# Patient Record
Sex: FEMALE | Race: WHITE | Employment: FULL TIME | URBAN - METROPOLITAN AREA
[De-identification: names, ages, dates, MRNs, and addresses within clinical notes are randomized per-mention and may not be internally consistent; named-entity substitution may affect disease eponyms.]

---

## 2017-08-29 ENCOUNTER — HOSPITAL ENCOUNTER (OUTPATIENT)
Dept: LAB | Age: 22
Discharge: HOME | End: 2017-08-29

## 2017-08-29 ENCOUNTER — OFFICE VISIT (OUTPATIENT)
Dept: INTERNAL MEDICINE CLINIC | Age: 22
End: 2017-08-29

## 2017-08-29 VITALS
WEIGHT: 263 LBS | HEART RATE: 93 BPM | DIASTOLIC BLOOD PRESSURE: 68 MMHG | HEIGHT: 65 IN | SYSTOLIC BLOOD PRESSURE: 124 MMHG | TEMPERATURE: 96.9 F | BODY MASS INDEX: 43.82 KG/M2 | OXYGEN SATURATION: 97 %

## 2017-08-29 DIAGNOSIS — Z86.2 HISTORY OF ANEMIA: Primary | ICD-10-CM

## 2017-08-29 PROCEDURE — 99001 SPECIMEN HANDLING PT-LAB: CPT | Performed by: NURSE PRACTITIONER

## 2017-08-29 NOTE — PROGRESS NOTES
HISTORY OF PRESENT ILLNESS  Radonna Cogan is a 25 y.o. female. HPI Comments: Patient was also concerned regarding a small patch of bruise on her leg followed by bug bite. Anemia   The history is provided by the patient. This is a chronic problem. The problem occurs constantly. The problem has not changed since onset. Pertinent negatives include no chest pain, no abdominal pain, no headaches and no shortness of breath. Nothing aggravates the symptoms. Nothing relieves the symptoms. She has tried nothing for the symptoms. The treatment provided no relief. Review of Systems   Constitutional: Negative for chills, fever and malaise/fatigue. HENT: Negative for congestion and hearing loss. Eyes: Negative for blurred vision and double vision. Respiratory: Negative for cough and shortness of breath. Cardiovascular: Negative for chest pain and palpitations. Gastrointestinal: Negative for abdominal pain, heartburn, nausea and vomiting. Musculoskeletal: Negative for myalgias. Skin: Negative for itching and rash. Neurological: Negative for dizziness and headaches. Endo/Heme/Allergies: Negative for environmental allergies and polydipsia. Does not bruise/bleed easily. Psychiatric/Behavioral: Negative for depression. Physical Exam   Constitutional: She is oriented to person, place, and time. She appears well-developed. No distress. HENT:   Head: Normocephalic. Eyes: Pupils are equal, round, and reactive to light. Neck: Neck supple. Cardiovascular: Regular rhythm. Pulmonary/Chest: Breath sounds normal.   Neurological: She is alert and oriented to person, place, and time. Skin: Skin is warm. She is not diaphoretic. Psychiatric: She has a normal mood and affect. Nursing note and vitals reviewed. ASSESSMENT and PLAN    ICD-10-CM ICD-9-CM    1. History of anemia Z86.2 V12.3 CBC WITH AUTOMATED DIFF   patient reassured that bruising might be caused by itching.  Advised to keep an eye on it. Report back if it starts spreading or worsening in any sense. Patient verbalized the understanding.

## 2017-08-29 NOTE — PROGRESS NOTES
ROOM # [address] presents today for   Chief Complaint   Patient presents with    Rash     left leg       Sarai Sheehan preferred language for health care discussion is english/other. Is someone accompanying this pt? no    Is the patient using any DME equipment during OV? no    Depression Screening:  PHQ over the last two weeks 8/29/2017   Little interest or pleasure in doing things Not at all   Feeling down, depressed or hopeless Not at all   Total Score PHQ 2 0       Learning Assessment:  Learning Assessment 8/29/2017   PRIMARY LEARNER Patient   HIGHEST LEVEL OF EDUCATION - PRIMARY LEARNER  GRADUATED HIGH SCHOOL OR GED   BARRIERS PRIMARY LEARNER NONE   CO-LEARNER CAREGIVER No   PRIMARY LANGUAGE ENGLISH   LEARNER PREFERENCE PRIMARY LISTENING   ANSWERED BY patient    RELATIONSHIP SELF       Abuse Screening:  n/i    Fall Risk  n/i    Health Maintenance reviewed and discussed per provider. Yes    Sarai Sheehan is due for INFLUENZA AGE 9 TO ADULT  Please order/place referral if appropriate. Advance Directive:  1. Do you have an advance directive in place? Patient Reply: no    2. If not, would you like material regarding how to put one in place? Patient Reply: no    Coordination of Care:  1. Have you been to the ER, urgent care clinic since your last visit? Hospitalized since your last visit? no    2. Have you seen or consulted any other health care providers outside of the [address] [place] [address] since your last visit? Include any pap smears or colon screening.  no

## 2017-08-29 NOTE — MR AVS SNAPSHOT
Visit Information Date & Time Provider Department Dept. Phone Encounter #  
 8/29/2017  4:45 PM Sheng Amezcua  [facility] [id number] Upcoming Health Maintenance Date Due  
 HPV AGE 9Y-34Y (1 of 3 - Female 3 Dose Series) 8/23/2006 PAP AKA CERVICAL CYTOLOGY 8/23/2016 INFLUENZA AGE 9 TO ADULT 8/1/2017 DTaP/Tdap/Td series (2 - Td) 12/21/2025 Allergies as of 8/29/2017  Review Complete On: 8/29/2017 By: Antonio Monreal Severity Noted Reaction Type Reactions Other Plant, Animal, Environmental  12/20/2016    Hives  
 chlorine Current Immunizations  Reviewed on 2/18/2016 Name Date DTaP 12/21/2015 Influenza Vaccine (Quad) PF 2/19/2016  2:29 PM  
 Tdap 12/21/2015 Not reviewed this visit You Were Diagnosed With   
  
 Codes Comments History of anemia    -  Primary ICD-10-CM: E17.5 ICD-9-CM: V12.3 Vitals BP Pulse Temp Height(growth percentile) Weight(growth percentile) SpO2  
 124/68 (BP 1 Location: Right arm, BP Patient Position: Sitting) 93 96.9 °F (36.1 °C) (Oral) 5' 5\" (1.651 m) 263 lb (119.3 kg) 97% BMI OB Status Smoking Status 43.77 kg/m2 Having regular periods Never Smoker BMI and BSA Data Body Mass Index Body Surface Area 43.77 kg/m 2 2.34 m 2 Preferred Pharmacy Pharmacy Name Phone [facility] #[id number] Rubinstein, [address] [facility] [phone] Your Updated Medication List  
  
   
This list is accurate as of: 8/29/17  5:05 PM.  Always use your most recent med list.  
  
  
  
  
 HYDROcodone-acetaminophen 5-500 mg per tablet Commonly known as:  Arnav Halls Take 1 Tab by mouth every eight (8) hours as needed for Pain. ibuprofen 800 mg tablet Commonly known as:  MOTRIN Take 1 Tab by mouth every eight (8) hours as needed for Pain. MINASTRIN 24 FE 1 mg-20 mcg(24) /75 mg (4) Chew Generic drug:  norethindrone-e.estradiol-iron PNV with Ca No.65-Iron Poly-FA 60 mg iron-1 mg Cap Take 1 Tab by mouth daily. To-Do List   
 08/29/2017 Lab:  CBC WITH AUTOMATED DIFF Introducing [facility] SERVICES! Tad Stewart introduces [employer] patient portal. Now you can access parts of your medical record, email your doctor's office, and request medication refills online. 1. In your internet browser, go to [email or web address] 2. Click on the First Time User? Click Here link in the Sign In box. You will see the New Member Sign Up page. 3. Enter your [employer] Access Code exactly as it appears below. You will not need to use this code after youve completed the sign-up process. If you do not sign up before the expiration date, you must request a new code. · [employer] Access Code: [email or web address] Expires: 11/27/2017  5:05 PM 
 
4. Enter the last four digits of your Social Security Number (xxxx) and Date of Birth (mm/dd/yyyy) as indicated and click Submit. You will be taken to the next sign-up page. 5. Create a [employer] ID. This will be your [employer] login ID and cannot be changed, so think of one that is secure and easy to remember. 6. Create a [employer] password. You can change your password at any time. 7. Enter your Password Reset Question and Answer. This can be used at a later time if you forget your password. 8. Enter your e-mail address. You will receive e-mail notification when new information is available in [address] 9. Click Sign Up. You can now view and download portions of your medical record. 10. Click the Download Summary menu link to download a portable copy of your medical information. If you have questions, please visit the Frequently Asked Questions section of the [employer] website. Remember, [employer] is NOT to be used for urgent needs. For medical emergencies, dial 911. Now available from your iPhone and Android! Please provide this summary of care documentation to your next provider. Your primary care clinician is listed as NONE. If you have any questions after today's visit, please call [phone].

## 2017-08-30 LAB
BASOPHILS # BLD AUTO: 0 X10E3/UL (ref 0–0.2)
BASOPHILS NFR BLD AUTO: 0 %
EOSINOPHIL # BLD AUTO: 0.8 X10E3/UL (ref 0–0.4)
EOSINOPHIL NFR BLD AUTO: 7 %
ERYTHROCYTE [DISTWIDTH] IN BLOOD BY AUTOMATED COUNT: 12.9 % (ref 12.3–15.4)
HCT VFR BLD AUTO: 35.8 % (ref 34–46.6)
HGB BLD-MCNC: 11.4 G/DL (ref 11.1–15.9)
IMM GRANULOCYTES # BLD: 0 X10E3/UL (ref 0–0.1)
IMM GRANULOCYTES NFR BLD: 0 %
LYMPHOCYTES # BLD AUTO: 3.4 X10E3/UL (ref 0.7–3.1)
LYMPHOCYTES NFR BLD AUTO: 29 %
MCH RBC QN AUTO: 26.3 PG (ref 26.6–33)
MCHC RBC AUTO-ENTMCNC: 31.8 G/DL (ref 31.5–35.7)
MCV RBC AUTO: 83 FL (ref 79–97)
MONOCYTES # BLD AUTO: 0.7 X10E3/UL (ref 0.1–0.9)
MONOCYTES NFR BLD AUTO: 6 %
NEUTROPHILS # BLD AUTO: 7 X10E3/UL (ref 1.4–7)
NEUTROPHILS NFR BLD AUTO: 58 %
PLATELET # BLD AUTO: 415 X10E3/UL (ref 150–379)
RBC # BLD AUTO: 4.34 X10E6/UL (ref 3.77–5.28)
WBC # BLD AUTO: 12 X10E3/UL (ref 3.4–10.8)

## 2017-09-05 ENCOUNTER — TELEPHONE (OUTPATIENT)
Dept: INTERNAL MEDICINE CLINIC | Age: 22
End: 2017-09-05

## 2017-09-08 ENCOUNTER — OFFICE VISIT (OUTPATIENT)
Dept: INTERNAL MEDICINE CLINIC | Age: 22
End: 2017-09-08

## 2017-09-08 VITALS
SYSTOLIC BLOOD PRESSURE: 162 MMHG | DIASTOLIC BLOOD PRESSURE: 93 MMHG | RESPIRATION RATE: 18 BRPM | TEMPERATURE: 95.7 F | WEIGHT: 263 LBS | OXYGEN SATURATION: 99 % | HEART RATE: 70 BPM | HEIGHT: 65 IN | BODY MASS INDEX: 43.82 KG/M2

## 2017-09-08 DIAGNOSIS — R79.89 ABNORMAL CBC: ICD-10-CM

## 2017-09-08 DIAGNOSIS — Z83.3 FAMILY HISTORY OF DIABETES MELLITUS: ICD-10-CM

## 2017-09-08 DIAGNOSIS — E66.09 NON MORBID OBESITY DUE TO EXCESS CALORIES: ICD-10-CM

## 2017-09-08 DIAGNOSIS — Z86.2 HISTORY OF ANEMIA: Primary | ICD-10-CM

## 2017-09-08 PROBLEM — E66.9 NON MORBID OBESITY: Status: ACTIVE | Noted: 2017-09-08

## 2017-09-08 NOTE — PROGRESS NOTES
HISTORY OF PRESENT ILLNESS  Fiorella Stone is a 25 y.o. female. HPI Comments: Patient was here to follow up on labs done recently, abnormal cbc results. Current complaints include obesity, excessive bruising. Follow-up   Pertinent negatives include no chest pain, no abdominal pain, no headaches and no shortness of breath. Review of Systems   Constitutional: Negative for chills, diaphoresis, fever, malaise/fatigue and weight loss. HENT: Negative for congestion, ear pain, hearing loss, sinus pain and sore throat. Eyes: Negative for blurred vision and double vision. Respiratory: Negative for cough and shortness of breath. Cardiovascular: Negative for chest pain and palpitations. Gastrointestinal: Negative for abdominal pain, heartburn, nausea and vomiting. Genitourinary: Negative for dysuria, frequency and urgency. Musculoskeletal: Negative for back pain, joint pain, myalgias and neck pain. Skin: Negative for rash. Neurological: Negative for dizziness, speech change, seizures, weakness and headaches. Endo/Heme/Allergies: Negative for environmental allergies and polydipsia. Bruises/bleeds easily. Psychiatric/Behavioral: Negative for depression, hallucinations and substance abuse. The patient is not nervous/anxious and does not have insomnia. Physical Exam   Constitutional: She is oriented to person, place, and time. She appears well-developed. No distress. HENT:   Head: Normocephalic and atraumatic. Right Ear: External ear normal.   Left Ear: External ear normal.   Mouth/Throat: Oropharynx is clear and moist. No oropharyngeal exudate. Eyes: EOM are normal. Pupils are equal, round, and reactive to light. Neck: Normal range of motion. Neck supple. No thyromegaly present. Cardiovascular: Regular rhythm. Pulmonary/Chest: Breath sounds normal. She has no wheezes. Abdominal: Soft. Bowel sounds are normal. She exhibits no distension. There is no tenderness. Musculoskeletal: Normal range of motion. She exhibits no edema or tenderness. Neurological: She is alert and oriented to person, place, and time. No cranial nerve deficit. Skin: Skin is warm and dry. No rash noted. She is not diaphoretic. No erythema. Psychiatric: She has a normal mood and affect. Nursing note and vitals reviewed. ASSESSMENT and PLAN    ICD-10-CM ICD-9-CM    1. History of anemia Z86.2 V12.3 CBC WITH AUTOMATED DIFF      CBC WITH AUTOMATED DIFF   2. Abnormal CBC R79.89 790.6 CBC WITH AUTOMATED DIFF      METABOLIC PANEL, COMPREHENSIVE      PROTHROMBIN TIME + INR      CBC WITH AUTOMATED DIFF      METABOLIC PANEL, COMPREHENSIVE      PROTHROMBIN TIME + INR   3. Non morbid obesity due to excess calories O83.79 596.61 METABOLIC PANEL, COMPREHENSIVE      TSH 3RD GENERATION      LIPID PANEL      METABOLIC PANEL, COMPREHENSIVE      TSH 3RD GENERATION      LIPID PANEL   4. Family history of diabetes mellitus Z83.3 V18.0 HEMOGLOBIN A1C WITH EAG      HEMOGLOBIN A1C WITH EAG   patient advised to frequently check her blood pressure for next 2 weeks and report the readings. Plan of care will be established accordingly. Head to toe assessment performed, all basic labs will be obtained. Patient teaching done regarding healthy eating and exercise. Patient advised to keep in contact with PCP for regular checkups. Patient verbalized the understanding of instructions and plan of care. Further plan of care will be established according to lab results.

## 2017-09-08 NOTE — PROGRESS NOTES
ROOM #8     Guicho Munoz presents today for   Chief Complaint   Patient presents with   [facility]     pt presents to stating that she need to have her labs re-done       Guicho Munoz preferred language for health care discussion is english/other. Is someone accompanying this pt? no    Is the patient using any DME equipment during OV? no    Depression Screening:  PHQ over the last two weeks 9/8/2017 8/29/2017   Little interest or pleasure in doing things Not at all Not at all   Feeling down, depressed or hopeless Not at all Not at all   Total Score PHQ 2 0 0       Learning Assessment:  Learning Assessment 8/29/2017   PRIMARY LEARNER Patient   HIGHEST LEVEL OF EDUCATION - PRIMARY LEARNER  GRADUATED HIGH SCHOOL OR GED   BARRIERS PRIMARY LEARNER NONE   CO-LEARNER CAREGIVER No   PRIMARY LANGUAGE ENGLISH   LEARNER PREFERENCE PRIMARY LISTENING   ANSWERED BY patient    RELATIONSHIP SELF       Abuse Screening:  No flowsheet data found. Fall Risk  No flowsheet data found. Health Maintenance reviewed and discussed per provider. Yes    Guicho Munoz is due for hpv, pap, inlfuenza. Please order/place referral if appropriate. Advance Directive:  1. Do you have an advance directive in place? Patient Reply: no    2. If not, would you like material regarding how to put one in place? Patient Reply: no    Coordination of Care:  1. Have you been to the ER, urgent care clinic since your last visit? Hospitalized since your last visit? no    2. Have you seen or consulted any other health care providers outside of the [address] [place] [address] since your last visit? Include any pap smears or colon screening.  no

## 2017-09-08 NOTE — PATIENT INSTRUCTIONS
Abnormal Weight Gain: Care Instructions  Your Care Instructions  There are two types of weight gain--normal and abnormal. Normal weight gain is usually caused by eating too much or exercising too little. It can also happen as you get older. But abnormal weight gain has other causes. It can be caused by a problem with your thyroid gland, called hypothyroidism. Or it can be caused by a problem with your adrenal glands, called Cushing's syndrome. Or your body could be holding too much fluid because of kidney, liver, or heart problems. In some cases, a medicine you take can cause you to gain weight. You can work with your doctor to find out the cause of your weight gain. You will probably need tests to do this. Follow-up care is a key part of your treatment and safety. Be sure to make and go to all appointments, and call your doctor if you are having problems. It's also a good idea to know your test results and keep a list of the medicines you take. How can you care for yourself at home? · Weigh yourself at the same time every day. It's best to do it first thing in the morning after you empty your bladder. Be sure to always wear the same amount of clothing. · Write down any changes in your weight and the possible causes. Discuss these with your doctor. · Your doctor may want you to change your diet and exercise habits. A good way to lose weight is to reduce calories and increase exercise. · Walking is an easy way to get exercise. Try to walk a little longer every day. You also may want to swim, bike, or do other activities. · Ask your doctor if you should see a dietitian. This is a person who can help you plan meals that work best for your lifestyle. · If your doctor prescribed medicines, take them exactly as prescribed. Call your doctor if you think you are having a problem with your medicine. You will get more details on the specific medicines your doctor prescribes.   When should you call for help?  Watch closely for changes in your health, and be sure to contact your doctor if:  · You do not get better as expected. · You continue to gain weight. Where can you learn more? Go to http://deven-emigdio.info/. Enter A175 in the search box to learn more about \"Abnormal Weight Gain: Care Instructions. \"  Current as of: October 13, 2016  Content Version: 11.3  © 8576-9782 [employer]. Care instructions adapted under license by [employer] (which disclaims liability or warranty for this information). If you have questions about a medical condition or this instruction, always ask your healthcare professional. [address] any warranty or liability for your use of this information.

## 2017-09-08 NOTE — MR AVS SNAPSHOT
Visit Information Date & Time Provider Department Dept. Phone Encounter #  
 9/8/2017 10:30 AM Ladi Feng NP [facility] [phone] [id number] Your Appointments 9/8/2017 10:30 AM  
Office Visit with Ladi Feng NP [facility] ([facility]) Appt Note: BLOOD WORK; blood work [address]  
  
   
 [address] [facility] Upcoming Health Maintenance Date Due  
 HPV AGE 9Y-34Y (1 of 3 - Female 3 Dose Series) 8/23/2006 PAP AKA CERVICAL CYTOLOGY 8/23/2016 INFLUENZA AGE 9 TO ADULT 8/1/2017 DTaP/Tdap/Td series (2 - Td) 12/21/2025 Allergies as of 9/8/2017  Review Complete On: 9/8/2017 By: Darlyn Us LPN Severity Noted Reaction Type Reactions Other Plant, Animal, Environmental  12/20/2016    Hives  
 chlorine Current Immunizations  Reviewed on 2/18/2016 Name Date DTaP 12/21/2015 Influenza Vaccine (Quad) PF 2/19/2016  2:29 PM  
 Tdap 12/21/2015 Not reviewed this visit You Were Diagnosed With   
  
 Codes Comments History of anemia    -  Primary ICD-10-CM: Y50.2 ICD-9-CM: V12.3 Abnormal CBC     ICD-10-CM: R79.89 ICD-9-CM: 790.6 Non morbid obesity due to excess calories     ICD-10-CM: E66.09 
ICD-9-CM: 278.00 Family history of diabetes mellitus     ICD-10-CM: Z83.3 ICD-9-CM: V18.0 Vitals BP Pulse Temp Resp Height(growth percentile) Weight(growth percentile) (!) 162/93 (BP 1 Location: Right arm, BP Patient Position: Sitting) 70 95.7 °F (35.4 °C) (Oral) 18 5' 5\" (1.651 m) 263 lb (119.3 kg) LMP SpO2 BMI OB Status Smoking Status 08/18/2017 99% 43.77 kg/m2 Having regular periods Never Smoker Vitals History BMI and BSA Data Body Mass Index Body Surface Area 43.77 kg/m 2 2.34 m 2 Preferred Pharmacy Pharmacy Name Phone [facility] #[id number] [place]ai, [address] [address] Jareth Gaytan [phone] Your Updated Medication List  
  
   
This list is accurate as of: 9/8/17 10:07 AM.  Always use your most recent med list.  
  
  
  
  
 HYDROcodone-acetaminophen 5-500 mg per tablet Commonly known as:  [address] Take 1 Tab by mouth every eight (8) hours as needed for Pain. ibuprofen 800 mg tablet Commonly known as:  MOTRIN Take 1 Tab by mouth every eight (8) hours as needed for Pain. MINASTRIN 24 FE 1 mg-20 mcg(24) /75 mg (4) Chew Generic drug:  norethindrone-e.estradiol-iron PNV with Ca No.65-Iron Poly-FA 60 mg iron-1 mg Cap Take 1 Tab by mouth daily. To-Do List   
 09/08/2017 Lab:  CBC WITH AUTOMATED DIFF   
  
 09/08/2017 Lab:  HEMOGLOBIN A1C WITH EAG   
  
 09/08/2017 Lab:  LIPID PANEL   
  
 09/08/2017 Lab:  METABOLIC PANEL, COMPREHENSIVE   
  
 09/08/2017 Lab:  PROTHROMBIN TIME + INR   
  
 09/08/2017 Lab:  TSH 3RD GENERATION Patient Instructions Abnormal Weight Gain: Care Instructions Your Care Instructions There are two types of weight gainnormal and abnormal. Normal weight gain is usually caused by eating too much or exercising too little. It can also happen as you get older. But abnormal weight gain has other causes. It can be caused by a problem with your thyroid gland, called hypothyroidism. Or it can be caused by a problem with your adrenal glands, called Cushing's syndrome. Or your body could be holding too much fluid because of kidney, liver, or heart problems. In some cases, a medicine you take can cause you to gain weight. You can work with your doctor to find out the cause of your weight gain. You will probably need tests to do this. Follow-up care is a key part of your treatment and safety.  Be sure to make and go to all appointments, and call your doctor if you are having problems. It's also a good idea to know your test results and keep a list of the medicines you take. How can you care for yourself at home? · Weigh yourself at the same time every day. It's best to do it first thing in the morning after you empty your bladder. Be sure to always wear the same amount of clothing. · Write down any changes in your weight and the possible causes. Discuss these with your doctor. · Your doctor may want you to change your diet and exercise habits. A good way to lose weight is to reduce calories and increase exercise. · Walking is an easy way to get exercise. Try to walk a little longer every day. You also may want to swim, bike, or do other activities. · Ask your doctor if you should see a dietitian. This is a person who can help you plan meals that work best for your lifestyle. · If your doctor prescribed medicines, take them exactly as prescribed. Call your doctor if you think you are having a problem with your medicine. You will get more details on the specific medicines your doctor prescribes. When should you call for help? Watch closely for changes in your health, and be sure to contact your doctor if: 
· You do not get better as expected. · You continue to gain weight. Where can you learn more? Go to http://deven-emigdio.info/. Enter A175 in the search box to learn more about \"Abnormal Weight Gain: Care Instructions. \" Current as of: October 13, 2016 Content Version: 11.3 © 0682-6014 [employer]. Care instructions adapted under license by [employer] (which disclaims liability or warranty for this information). If you have questions about a medical condition or this instruction, always ask your healthcare professional. [address] any warranty or liability for your use of this information. Introducing [facility]!    
 Lilly Morales introduces [employer] patient portal. Now you can access parts of your medical record, email your doctor's office, and request medication refills online. 1. In your internet browser, go to [email or web address] 2. Click on the First Time User? Click Here link in the Sign In box. You will see the New Member Sign Up page. 3. Enter your [employer] Access Code exactly as it appears below. You will not need to use this code after youve completed the sign-up process. If you do not sign up before the expiration date, you must request a new code. · [employer] Access Code: [email or web address] Expires: 11/27/2017  5:05 PM 
 
4. Enter the last four digits of your Social Security Number (xxxx) and Date of Birth (mm/dd/yyyy) as indicated and click Submit. You will be taken to the next sign-up page. 5. Create a [employer] ID. This will be your [employer] login ID and cannot be changed, so think of one that is secure and easy to remember. 6. Create a [employer] password. You can change your password at any time. 7. Enter your Password Reset Question and Answer. This can be used at a later time if you forget your password. 8. Enter your e-mail address. You will receive e-mail notification when new information is available in [address] 9. Click Sign Up. You can now view and download portions of your medical record. 10. Click the Download Summary menu link to download a portable copy of your medical information. If you have questions, please visit the Frequently Asked Questions section of the [employer] website. Remember, [employer] is NOT to be used for urgent needs. For medical emergencies, dial 911. Now available from your iPhone and Android! Please provide this summary of care documentation to your next provider. Your primary care clinician is listed as NONE. If you have any questions after today's visit, please call [phone].

## 2017-09-09 LAB
ALBUMIN SERPL-MCNC: 4.5 G/DL (ref 3.5–5.5)
ALBUMIN/GLOB SERPL: 1.7 {RATIO} (ref 1.2–2.2)
ALP SERPL-CCNC: 83 IU/L (ref 39–117)
ALT SERPL-CCNC: 24 IU/L (ref 0–32)
AST SERPL-CCNC: 14 IU/L (ref 0–40)
BASOPHILS # BLD AUTO: 0.1 X10E3/UL (ref 0–0.2)
BASOPHILS NFR BLD AUTO: 0 %
BILIRUB SERPL-MCNC: 0.3 MG/DL (ref 0–1.2)
BUN SERPL-MCNC: 9 MG/DL (ref 6–20)
BUN/CREAT SERPL: 15 (ref 9–23)
CALCIUM SERPL-MCNC: 9.7 MG/DL (ref 8.7–10.2)
CHLORIDE SERPL-SCNC: 100 MMOL/L (ref 96–106)
CHOLEST SERPL-MCNC: 193 MG/DL (ref 100–199)
CO2 SERPL-SCNC: 24 MMOL/L (ref 18–29)
CREAT SERPL-MCNC: 0.59 MG/DL (ref 0.57–1)
EOSINOPHIL # BLD AUTO: 1.6 X10E3/UL (ref 0–0.4)
EOSINOPHIL NFR BLD AUTO: 12 %
ERYTHROCYTE [DISTWIDTH] IN BLOOD BY AUTOMATED COUNT: 13 % (ref 12.3–15.4)
EST. AVERAGE GLUCOSE BLD GHB EST-MCNC: 94 MG/DL
GLOBULIN SER CALC-MCNC: 2.7 G/DL (ref 1.5–4.5)
GLUCOSE SERPL-MCNC: 87 MG/DL (ref 65–99)
HBA1C MFR BLD: 4.9 % (ref 4.8–5.6)
HCT VFR BLD AUTO: 39.3 % (ref 34–46.6)
HDLC SERPL-MCNC: 66 MG/DL
HGB BLD-MCNC: 12.7 G/DL (ref 11.1–15.9)
IMM GRANULOCYTES # BLD: 0 X10E3/UL (ref 0–0.1)
IMM GRANULOCYTES NFR BLD: 0 %
INR PPP: 0.9 (ref 0.8–1.2)
INTERPRETATION, 910389: NORMAL
LDLC SERPL CALC-MCNC: 110 MG/DL (ref 0–99)
LYMPHOCYTES # BLD AUTO: 2.9 X10E3/UL (ref 0.7–3.1)
LYMPHOCYTES NFR BLD AUTO: 23 %
MCH RBC QN AUTO: 27.1 PG (ref 26.6–33)
MCHC RBC AUTO-ENTMCNC: 32.3 G/DL (ref 31.5–35.7)
MCV RBC AUTO: 84 FL (ref 79–97)
MONOCYTES # BLD AUTO: 0.6 X10E3/UL (ref 0.1–0.9)
MONOCYTES NFR BLD AUTO: 5 %
NEUTROPHILS # BLD AUTO: 7.4 X10E3/UL (ref 1.4–7)
NEUTROPHILS NFR BLD AUTO: 60 %
PLATELET # BLD AUTO: 398 X10E3/UL (ref 150–379)
POTASSIUM SERPL-SCNC: 4.8 MMOL/L (ref 3.5–5.2)
PROT SERPL-MCNC: 7.2 G/DL (ref 6–8.5)
PROTHROMBIN TIME: 9.5 SEC (ref 9.1–12)
RBC # BLD AUTO: 4.69 X10E6/UL (ref 3.77–5.28)
SODIUM SERPL-SCNC: 142 MMOL/L (ref 134–144)
TRIGL SERPL-MCNC: 85 MG/DL (ref 0–149)
TSH SERPL DL<=0.005 MIU/L-ACNC: 1.37 UIU/ML (ref 0.45–4.5)
VLDLC SERPL CALC-MCNC: 17 MG/DL (ref 5–40)
WBC # BLD AUTO: 12.5 X10E3/UL (ref 3.4–10.8)

## 2017-09-12 DIAGNOSIS — R79.89 ABNORMAL CBC: Primary | ICD-10-CM

## 2018-01-02 ENCOUNTER — OFFICE VISIT (OUTPATIENT)
Dept: INTERNAL MEDICINE CLINIC | Age: 23
End: 2018-01-02

## 2018-01-02 VITALS
WEIGHT: 288 LBS | TEMPERATURE: 97.4 F | DIASTOLIC BLOOD PRESSURE: 83 MMHG | RESPIRATION RATE: 18 BRPM | OXYGEN SATURATION: 98 % | HEART RATE: 92 BPM | HEIGHT: 65 IN | BODY MASS INDEX: 47.98 KG/M2 | SYSTOLIC BLOOD PRESSURE: 131 MMHG

## 2018-01-02 DIAGNOSIS — J03.90 TONSILLITIS WITH EXUDATE: ICD-10-CM

## 2018-01-02 DIAGNOSIS — J02.9 SORE THROAT: ICD-10-CM

## 2018-01-02 DIAGNOSIS — R05.9 COUGH: Primary | ICD-10-CM

## 2018-01-02 LAB
S PYO AG THROAT QL: NEGATIVE
VALID INTERNAL CONTROL?: YES

## 2018-01-02 RX ORDER — AMOXICILLIN AND CLAVULANATE POTASSIUM 875; 125 MG/1; MG/1
1 TABLET, FILM COATED ORAL EVERY 12 HOURS
Qty: 10 TAB | Refills: 0 | Status: SHIPPED | OUTPATIENT
Start: 2018-01-02 | End: 2018-01-07

## 2018-01-02 RX ORDER — BENZONATATE 100 MG/1
100 CAPSULE ORAL
Qty: 21 CAP | Refills: 0 | Status: SHIPPED | OUTPATIENT
Start: 2018-01-02 | End: 2018-01-09

## 2018-01-02 NOTE — PATIENT INSTRUCTIONS

## 2018-01-02 NOTE — PROGRESS NOTES
HISTORY OF PRESENT ILLNESS  Mary Jo Art is a 25 y.o. female. Cold Symptoms   The history is provided by the patient. This is a new problem. The current episode started yesterday. The problem occurs constantly. The problem has not changed since onset. The cough is non-productive. There has been no fever. Associated symptoms include chills, ear congestion, ear pain, headaches, rhinorrhea and sore throat. Pertinent negatives include no chest pain, no sweats, no weight loss, no eye redness, no myalgias, no shortness of breath, no wheezing, no nausea, no vomiting and no confusion. She has tried nothing for the symptoms. The treatment provided no relief. She is not a smoker. Her past medical history is significant for bronchitis. Her past medical history does not include pneumonia or asthma. Review of Systems   Constitutional: Positive for chills, fever and malaise/fatigue. Negative for weight loss. HENT: Positive for congestion, ear pain, rhinorrhea, sinus pain and sore throat. Eyes: Negative for blurred vision, double vision, photophobia and redness. Respiratory: Positive for cough and sputum production. Negative for shortness of breath and wheezing. Cardiovascular: Negative for chest pain and palpitations. Gastrointestinal: Negative for heartburn, nausea and vomiting. Musculoskeletal: Negative for myalgias. Neurological: Positive for headaches. Negative for dizziness. Endo/Heme/Allergies: Negative for environmental allergies and polydipsia. Psychiatric/Behavioral: Negative for confusion. The patient is not nervous/anxious. Physical Exam   Constitutional: She is oriented to person, place, and time. She appears well-developed and well-nourished. No distress. HENT:   Head: Normocephalic and atraumatic. Right Ear: Tympanic membrane is injected and erythematous. Left Ear: Tympanic membrane is injected and erythematous. Nose: Mucosal edema and rhinorrhea present.  Right sinus exhibits no frontal sinus tenderness. Left sinus exhibits no frontal sinus tenderness. Mouth/Throat: Mucous membranes are normal. Mucous membranes are not pale and not dry. Oropharyngeal exudate, posterior oropharyngeal edema and posterior oropharyngeal erythema present. No tonsillar abscesses. Eyes: EOM are normal. Pupils are equal, round, and reactive to light. Neck: Neck supple. Cardiovascular: Regular rhythm. Pulmonary/Chest: Breath sounds normal. No respiratory distress. She has no wheezes. Lymphadenopathy:     She has no cervical adenopathy. Neurological: She is alert and oriented to person, place, and time. No cranial nerve deficit. Coordination normal.   Skin: Skin is dry. She is not diaphoretic. No erythema. Psychiatric: She has a normal mood and affect. Nursing note and vitals reviewed. ASSESSMENT and PLAN    ICD-10-CM ICD-9-CM    1. Cough R05 786.2 amoxicillin-clavulanate (AUGMENTIN) 875-125 mg per tablet      benzonatate (TESSALON) 100 mg capsule   2. Sore throat J02.9 462 amoxicillin-clavulanate (AUGMENTIN) 875-125 mg per tablet      AMB POC RAPID STREP A   3. Tonsillitis with exudate J03.90 463 amoxicillin-clavulanate (AUGMENTIN) 875-125 mg per tablet   Patient advised to take medication as prescribed. Take rest and plenty of fluids. Alternate tylenol and ibuprofen for fever. Return to clinic if condition worsens in next 72 hours.

## 2018-01-02 NOTE — PROGRESS NOTES
ROOM # 3    Cesilia Mccord presents today for   Chief Complaint   Patient presents with    Cold Symptoms     cough, green mucus, sore thorat, x 1 week       Cesilia Mccord preferred language for health care discussion is english/other. Is someone accompanying this pt? no    Is the patient using any DME equipment during OV? no    Depression Screening:  PHQ over the last two weeks 1/2/2018 9/8/2017 8/29/2017   Little interest or pleasure in doing things Not at all Not at all Not at all   Feeling down, depressed or hopeless Not at all Not at all Not at all   Total Score PHQ 2 0 0 0       Learning Assessment:  Learning Assessment 8/29/2017   PRIMARY LEARNER Patient   HIGHEST LEVEL OF EDUCATION - PRIMARY LEARNER  GRADUATED HIGH SCHOOL OR GED   BARRIERS PRIMARY LEARNER NONE   CO-LEARNER CAREGIVER No   PRIMARY LANGUAGE ENGLISH   LEARNER PREFERENCE PRIMARY LISTENING   ANSWERED BY patient    RELATIONSHIP SELF       Abuse Screening:  No flowsheet data found. Fall Risk  No flowsheet data found. Health Maintenance reviewed and discussed per provider. Yes    Cesilia Mccord is due for pt will discuss with PCP. Please order/place referral if appropriate. Advance Directive:  1. Do you have an advance directive in place? Patient Reply: no    2. If not, would you like material regarding how to put one in place? Patient Reply: no    Coordination of Care:  1. Have you been to the ER, urgent care clinic since your last visit? Hospitalized since your last visit? no    2. Have you seen or consulted any other health care providers outside of the [address] [place] [address] since your last visit? Include any pap smears or colon screening.  no

## 2018-01-02 NOTE — MR AVS SNAPSHOT
Visit Information Date & Time Provider Department Dept. Phone Encounter #  
 1/2/2018  2:45 PM Jayne Borja NP [facility] 139 [id number] Upcoming Health Maintenance Date Due  
 PAP AKA CERVICAL CYTOLOGY 8/23/2016 Influenza Age 5 to Adult 8/1/2017 DTaP/Tdap/Td series (4 - Td) 12/21/2025 Allergies as of 1/2/2018  Review Complete On: 1/2/2018 By: Jayne Borja NP Severity Noted Reaction Type Reactions Other Plant, Animal, Environmental  12/20/2016    Hives  
 chlorine Current Immunizations  Reviewed on 1/2/2018 Name Date DTaP 12/21/2015 HPV 6/4/2013, 2/13/2007 Hep A Vaccine 3/4/2008 Hep B Vaccine 10/27/1999, 8/28/1996, 1995 Influenza Vaccine (Quad) PF 2/19/2016  2:29 PM  
 MMR 8/15/2000, 9/15/1997 Meningococcal (MCV4P) Vaccine 6/4/2013, 2/13/2007 Td 8/15/2005 Tdap 12/21/2015, 6/29/2011 Varicella Virus Vaccine 3/4/2008 Reviewed by Pau Gonzalez PHARMD on 1/2/2018 at  1:49 PM  
You Were Diagnosed With   
  
 Codes Comments Cough    -  Primary ICD-10-CM: W01 ICD-9-CM: 786.2 Sore throat     ICD-10-CM: J02.9 ICD-9-CM: 693 Tonsillitis with exudate     ICD-10-CM: J03.90 ICD-9-CM: 125 Vitals BP Pulse Temp Resp Height(growth percentile) Weight(growth percentile) 131/83 (BP 1 Location: Right arm, BP Patient Position: Sitting) 92 97.4 °F (36.3 °C) (Oral) 18 5' 5\" (1.651 m) 288 lb (130.6 kg) LMP SpO2 BMI OB Status Smoking Status 12/07/2017 98% 47.93 kg/m2 Having regular periods Never Smoker Vitals History BMI and BSA Data Body Mass Index Body Surface Area  
 47.93 kg/m 2 2.45 m 2 Preferred Pharmacy Pharmacy Name Phone [facility] [phone] Your Updated Medication List  
  
   
This list is accurate as of: 1/2/18  3:24 PM.  Always use your most recent med list.  
  
  
  
  
 amoxicillin-clavulanate 875-125 mg per tablet Commonly known as:  AUGMENTIN Take 1 Tab by mouth every twelve (12) hours for 5 days. benzonatate 100 mg capsule Commonly known as:  TESSALON Take 1 Cap by mouth three (3) times daily as needed for Cough for up to 7 days. HYDROcodone-acetaminophen 5-500 mg per tablet Commonly known as:  Anola Roe Take 1 Tab by mouth every eight (8) hours as needed for Pain. ibuprofen 800 mg tablet Commonly known as:  MOTRIN Take 1 Tab by mouth every eight (8) hours as needed for Pain. MINASTRIN 24 FE 1 mg-20 mcg(24) /75 mg (4) Chew Generic drug:  norethindrone-e.estradiol-iron PNV with Ca No.65-Iron Poly-FA 60 mg iron-1 mg Cap Take 1 Tab by mouth daily. Prescriptions Sent to Pharmacy Refills  
 amoxicillin-clavulanate (AUGMENTIN) 875-125 mg per tablet 0 Sig: Take 1 Tab by mouth every twelve (12) hours for 5 days. Class: Normal  
 Pharmacy: [address] [place] [address] Ph #: [phone] Route: Oral  
 benzonatate (TESSALON) 100 mg capsule 0 Sig: Take 1 Cap by mouth three (3) times daily as needed for Cough for up to 7 days. Class: Normal  
 Pharmacy: [address] [place] [address] Ph #: [phone] Route: Oral  
  
Patient Instructions Cough: Care Instructions Your Care Instructions A cough is your body's response to something that bothers your throat or airways. Many things can cause a cough. You might cough because of a cold or the flu, bronchitis, or asthma. Smoking, postnasal drip, allergies, and stomach acid that backs up into your throat also can cause coughs. A cough is a symptom, not a disease. Most coughs stop when the cause, such as a cold, goes away. You can take a few steps at home to cough less and feel better. Follow-up care is a key part of your treatment and safety.  Be sure to make and go to all appointments, and call your doctor if you are having problems. It's also a good idea to know your test results and keep a list of the medicines you take. How can you care for yourself at home? · Drink lots of water and other fluids. This helps thin the mucus and soothes a dry or sore throat. Honey or lemon juice in hot water or tea may ease a dry cough. · Take cough medicine as directed by your doctor. · Prop up your head on pillows to help you breathe and ease a dry cough. · Try cough drops to soothe a dry or sore throat. Cough drops don't stop a cough. Medicine-flavored cough drops are no better than candy-flavored drops or hard candy. · Do not smoke. Avoid secondhand smoke. If you need help quitting, talk to your doctor about stop-smoking programs and medicines. These can increase your chances of quitting for good. When should you call for help? Call 911 anytime you think you may need emergency care. For example, call if: 
? · You have severe trouble breathing. ?Call your doctor now or seek immediate medical care if: 
? · You cough up blood. ? · You have new or worse trouble breathing. ? · You have a new or higher fever. ? · You have a new rash. ? Watch closely for changes in your health, and be sure to contact your doctor if: 
? · You cough more deeply or more often, especially if you notice more mucus or a change in the color of your mucus. ? · You have new symptoms, such as a sore throat, an earache, or sinus pain. ? · You do not get better as expected. Where can you learn more? Go to http://deven-emigdio.info/. Enter D279 in the search box to learn more about \"Cough: Care Instructions. \" Current as of: May 12, 2017 Content Version: 11.4 © 0001-3833 [employer]. Care instructions adapted under license by [employer] (which disclaims liability or warranty for this information).  If you have questions about a medical condition or this instruction, always ask your healthcare professional. [place] [address] any warranty or liability for your use of this information. Introducing [facility]! Elisabeth Marlow introduces [employer] patient portal. Now you can access parts of your medical record, email your doctor's office, and request medication refills online. 1. In your internet browser, go to [email or web address]. "[employer]/[email or web address] 2. Click on the First Time User? Click Here link in the Sign In box. You will see the New Member Sign Up page. 3. Enter your [employer] Access Code exactly as it appears below. You will not need to use this code after youve completed the sign-up process. If you do not sign up before the expiration date, you must request a new code. · [employer] Access Code: [email or web address] Expires: 4/2/2018  3:24 PM 
 
4. Enter the last four digits of your Social Security Number (xxxx) and Date of Birth (mm/dd/yyyy) as indicated and click Submit. You will be taken to the next sign-up page. 5. Create a [employer] ID. This will be your [employer] login ID and cannot be changed, so think of one that is secure and easy to remember. 6. Create a [employer] password. You can change your password at any time. 7. Enter your Password Reset Question and Answer. This can be used at a later time if you forget your password. 8. Enter your e-mail address. You will receive e-mail notification when new information is available in [address] 9. Click Sign Up. You can now view and download portions of your medical record. 10. Click the Download Summary menu link to download a portable copy of your medical information. If you have questions, please visit the Frequently Asked Questions section of the [employer] website. Remember, [employer] is NOT to be used for urgent needs. For medical emergencies, dial 911. Now available from your iPhone and Android! Please provide this summary of care documentation to your next provider. Your primary care clinician is listed as Batsheva Choi. If you have any questions after today's visit, please call [phone].

## 2018-04-09 ENCOUNTER — OFFICE VISIT (OUTPATIENT)
Dept: INTERNAL MEDICINE CLINIC | Age: 23
End: 2018-04-09

## 2018-04-09 VITALS
BODY MASS INDEX: 48.65 KG/M2 | WEIGHT: 292 LBS | RESPIRATION RATE: 16 BRPM | HEART RATE: 103 BPM | DIASTOLIC BLOOD PRESSURE: 84 MMHG | TEMPERATURE: 96.6 F | HEIGHT: 65 IN | SYSTOLIC BLOOD PRESSURE: 135 MMHG | OXYGEN SATURATION: 95 %

## 2018-04-09 DIAGNOSIS — R11.14 BILIOUS VOMITING WITH NAUSEA: Primary | ICD-10-CM

## 2018-04-09 DIAGNOSIS — R19.7 DIARRHEA, UNSPECIFIED TYPE: ICD-10-CM

## 2018-04-09 PROBLEM — E66.01 OBESITY, MORBID (HCC): Status: ACTIVE | Noted: 2018-04-09

## 2018-04-09 RX ORDER — ONDANSETRON 4 MG/1
4 TABLET, ORALLY DISINTEGRATING ORAL
Qty: 9 TAB | Refills: 0 | Status: SHIPPED | OUTPATIENT
Start: 2018-04-09 | End: 2018-04-12

## 2018-04-09 RX ORDER — METOCLOPRAMIDE 5 MG/1
5 TABLET ORAL
Qty: 30 TAB | Refills: 0 | Status: SHIPPED | OUTPATIENT
Start: 2018-04-09 | End: 2018-04-19

## 2018-04-09 RX ORDER — PANTOPRAZOLE SODIUM 40 MG/1
40 TABLET, DELAYED RELEASE ORAL DAILY
Qty: 30 TAB | Refills: 0 | Status: SHIPPED | OUTPATIENT
Start: 2018-04-09 | End: 2018-06-02 | Stop reason: SDUPTHER

## 2018-04-09 NOTE — PATIENT INSTRUCTIONS
Diarrhea: Care Instructions  Your Care Instructions    Diarrhea is loose, watery stools (bowel movements). The exact cause is often hard to find. Sometimes diarrhea is your body's way of getting rid of what caused an upset stomach. Viruses, food poisoning, and many medicines can cause diarrhea. Some people get diarrhea in response to emotional stress, anxiety, or certain foods. Almost everyone has diarrhea now and then. It usually isn't serious, and your stools will return to normal soon. The important thing to do is replace the fluids you have lost, so you can prevent dehydration. The doctor has checked you carefully, but problems can develop later. If you notice any problems or new symptoms, get medical treatment right away. Follow-up care is a key part of your treatment and safety. Be sure to make and go to all appointments, and call your doctor if you are having problems. It's also a good idea to know your test results and keep a list of the medicines you take. How can you care for yourself at home? · Watch for signs of dehydration, which means your body has lost too much water. Dehydration is a serious condition and should be treated right away. Signs of dehydration are:  ¨ Increasing thirst and dry eyes and mouth. ¨ Feeling faint or lightheaded. ¨ Darker urine, and a smaller amount of urine than normal.  · To prevent dehydration, drink plenty of fluids, enough so that your urine is light yellow or clear like water. Choose water and other caffeine-free clear liquids until you feel better. If you have kidney, heart, or liver disease and have to limit fluids, talk with your doctor before you increase the amount of fluids you drink. · Begin eating small amounts of mild foods the next day, if you feel like it. ¨ Try yogurt that has live cultures of Lactobacillus. (Check the label.)  ¨ Avoid spicy foods, fruits, alcohol, and caffeine until 48 hours after all symptoms are gone.   ¨ Avoid chewing gum that contains sorbitol. ¨ Avoid dairy products (except for yogurt with Lactobacillus) while you have diarrhea and for 3 days after symptoms are gone. · The doctor may recommend that you take over-the-counter medicine, such as loperamide (Imodium), if you still have diarrhea after 6 hours. Read and follow all instructions on the label. Do not use this medicine if you have bloody diarrhea, a high fever, or other signs of serious illness. Call your doctor if you think you are having a problem with your medicine. When should you call for help? Call 911 anytime you think you may need emergency care. For example, call if:  ? · You passed out (lost consciousness). ? · Your stools are maroon or very bloody. ?Call your doctor now or seek immediate medical care if:  ? · You are dizzy or lightheaded, or you feel like you may faint. ? · Your stools are black and look like tar, or they have streaks of blood. ? · You have new or worse belly pain. ? · You have symptoms of dehydration, such as:  ¨ Dry eyes and a dry mouth. ¨ Passing only a little dark urine. ¨ Feeling thirstier than usual.   ? · You have a new or higher fever. ? Watch closely for changes in your health, and be sure to contact your doctor if:  ? · Your diarrhea is getting worse. ? · You see pus in the diarrhea. ? · You are not getting better after 2 days (48 hours). Where can you learn more? Go to http://deven-emigdio.info/. Enter R987 in the search box to learn more about \"Diarrhea: Care Instructions. \"  Current as of: March 20, 2017  Content Version: 11.4  © 0371-9677 [employer]. Care instructions adapted under license by [employer] (which disclaims liability or warranty for this information). If you have questions about a medical condition or this instruction, always ask your healthcare professional. [address] any warranty or liability for your use of this information.

## 2018-04-09 NOTE — MR AVS SNAPSHOT
[address] [place] [address] 
 
 
 [address] [facility] 83 [id number] 
[phone] Patient: Rosa Elena Wilks MRN: [id number] :1995 Visit Information Date & Time Provider Department Dept. Phone Encounter #  
 2018  3:00 PM CORNELIUS Nicholseris[address]ma [address] [id number] Upcoming Health Maintenance Date Due  
 PAP AKA CERVICAL CYTOLOGY 2016 Influenza Age 5 to Adult 2017 DTaP/Tdap/Td series (4 - Td) 2025 Allergies as of 2018  Review Complete On: 2018 By: Dana Mayer Severity Noted Reaction Type Reactions Other Plant, Animal, Environmental  2016    Hives  
 chlorine Current Immunizations  Reviewed on 2018 Name Date DTaP 2015 HPV 2013, 2007 Hep A Vaccine 3/4/2008 Hep B Vaccine 10/27/1999, 1996, 1995 Influenza Vaccine (Quad) PF 2016  2:29 PM  
 MMR 8/15/2000, 9/15/1997 Meningococcal (MCV4P) Vaccine 2013, 2007 Td 8/15/2005 Tdap 2015, 2011 Varicella Virus Vaccine 3/4/2008 Not reviewed this visit You Were Diagnosed With   
  
 Codes Comments Bilious vomiting with nausea    -  Primary ICD-10-CM: R11.14 
ICD-9-CM: 787.04 Diarrhea, unspecified type     ICD-10-CM: R19.7 ICD-9-CM: 787.91 Vitals BP Pulse Temp Resp Height(growth percentile) Weight(growth percentile) 135/84 (BP 1 Location: Right arm, BP Patient Position: Sitting) (!) 103 96.6 °F (35.9 °C) (Oral) 16 5' 5\" (1.651 m) 292 lb (132.5 kg) LMP SpO2 BMI OB Status Smoking Status 2018 (Approximate) 95% 48.59 kg/m2 Having regular periods Never Smoker Vitals History BMI and BSA Data Body Mass Index Body Surface Area 48.59 kg/m 2 2.47 m 2 Preferred Pharmacy Pharmacy Name Phone [facility] [phone] Your Updated Medication List  
  
   
This list is accurate as of 4/9/18  3:44 PM.  Always use your most recent med list.  
  
  
  
  
 HYDROcodone-acetaminophen 5-500 mg per tablet Commonly known as:  Aristides Challenger Take 1 Tab by mouth every eight (8) hours as needed for Pain. ibuprofen 800 mg tablet Commonly known as:  MOTRIN Take 1 Tab by mouth every eight (8) hours as needed for Pain.  
  
 metoclopramide HCl 5 mg tablet Commonly known as:  REGLAN Take 1 Tab by mouth Before breakfast, lunch, and dinner for 10 days. MINASTRIN 24 FE 1 mg-20 mcg(24) /75 mg (4) Chew Generic drug:  norethindrone-e.estradiol-iron  
  
 ondansetron 4 mg disintegrating tablet Commonly known as:  ZOFRAN ODT Take 1 Tab by mouth every eight (8) hours as needed for Nausea for up to 3 days. pantoprazole 40 mg tablet Commonly known as:  PROTONIX Take 1 Tab by mouth daily for 30 days. PNV with Ca No.65-Iron Poly-FA 60 mg iron-1 mg Cap Take 1 Tab by mouth daily. Prescriptions Sent to Pharmacy Refills  
 ondansetron (ZOFRAN ODT) 4 mg disintegrating tablet 0 Sig: Take 1 Tab by mouth every eight (8) hours as needed for Nausea for up to 3 days. Class: Normal  
 Pharmacy: [address] [place] [address] Ph #: [phone] Route: Oral  
 pantoprazole (PROTONIX) 40 mg tablet 0 Sig: Take 1 Tab by mouth daily for 30 days. Class: Normal  
 Pharmacy: [address] [place] [address] Ph #: [phone] Route: Oral  
 metoclopramide HCl (REGLAN) 5 mg tablet 0 Sig: Take 1 Tab by mouth Before breakfast, lunch, and dinner for 10 days. Class: Normal  
 Pharmacy: [address] [place] [address] Ph #: [phone] Route: Oral  
  
To-Do List   
 04/09/2018 Lab:  CBC WITH AUTOMATED DIFF   
  
 04/09/2018 Microbiology:  CULTURE, STOOL   
  
 04/09/2018 Lab: METABOLIC PANEL, COMPREHENSIVE Patient Instructions Diarrhea: Care Instructions Your Care Instructions Diarrhea is loose, watery stools (bowel movements). The exact cause is often hard to find. Sometimes diarrhea is your body's way of getting rid of what caused an upset stomach. Viruses, food poisoning, and many medicines can cause diarrhea. Some people get diarrhea in response to emotional stress, anxiety, or certain foods. Almost everyone has diarrhea now and then. It usually isn't serious, and your stools will return to normal soon. The important thing to do is replace the fluids you have lost, so you can prevent dehydration. The doctor has checked you carefully, but problems can develop later. If you notice any problems or new symptoms, get medical treatment right away. Follow-up care is a key part of your treatment and safety. Be sure to make and go to all appointments, and call your doctor if you are having problems. It's also a good idea to know your test results and keep a list of the medicines you take. How can you care for yourself at home? · Watch for signs of dehydration, which means your body has lost too much water. Dehydration is a serious condition and should be treated right away. Signs of dehydration are: 
¨ Increasing thirst and dry eyes and mouth. ¨ Feeling faint or lightheaded. ¨ Darker urine, and a smaller amount of urine than normal. 
· To prevent dehydration, drink plenty of fluids, enough so that your urine is light yellow or clear like water. Choose water and other caffeine-free clear liquids until you feel better. If you have kidney, heart, or liver disease and have to limit fluids, talk with your doctor before you increase the amount of fluids you drink. · Begin eating small amounts of mild foods the next day, if you feel like it. ¨ Try yogurt that has live cultures of Lactobacillus. (Check the label.) ¨ Avoid spicy foods, fruits, alcohol, and caffeine until 48 hours after all symptoms are gone. ¨ Avoid chewing gum that contains sorbitol. ¨ Avoid dairy products (except for yogurt with Lactobacillus) while you have diarrhea and for 3 days after symptoms are gone. · The doctor may recommend that you take over-the-counter medicine, such as loperamide (Imodium), if you still have diarrhea after 6 hours. Read and follow all instructions on the label. Do not use this medicine if you have bloody diarrhea, a high fever, or other signs of serious illness. Call your doctor if you think you are having a problem with your medicine. When should you call for help? Call 911 anytime you think you may need emergency care. For example, call if: 
? · You passed out (lost consciousness). ? · Your stools are maroon or very bloody. ?Call your doctor now or seek immediate medical care if: 
? · You are dizzy or lightheaded, or you feel like you may faint. ? · Your stools are black and look like tar, or they have streaks of blood. ? · You have new or worse belly pain. ? · You have symptoms of dehydration, such as: ¨ Dry eyes and a dry mouth. ¨ Passing only a little dark urine. ¨ Feeling thirstier than usual.  
? · You have a new or higher fever. ? Watch closely for changes in your health, and be sure to contact your doctor if: 
? · Your diarrhea is getting worse. ? · You see pus in the diarrhea. ? · You are not getting better after 2 days (48 hours). Where can you learn more? Go to http://deven-emigdio.info/. Enter M436 in the search box to learn more about \"Diarrhea: Care Instructions. \" Current as of: March 20, 2017 Content Version: 11.4 © 5110-6573 [employer]. Care instructions adapted under license by [employer] (which disclaims liability or warranty for this information).  If you have questions about a medical condition or this instruction, always ask your healthcare professional. [address] any warranty or liability for your use of this information. Introducing [facility]! Rosa Garces introduces LocalL[employer] patient portal. Now you can access parts of your medical record, email your doctor's office, and request medication refills online. 1. In your internet browser, go to [email or web address] 2. Click on the First Time User? Click Here link in the Sign In box. You will see the New Member Sign Up page. 3. Enter your LocalLux Access Code exactly as it appears below. You will not need to use this code after youve completed the sign-up process. If you do not sign up before the expiration date, you must request a new code. · LocalLux Access Code: [email or web address] Expires: 7/8/2018  3:44 PM 
 
4. Enter the last four digits of your Social Security Number (xxxx) and Date of Birth (mm/dd/yyyy) as indicated and click Submit. You will be taken to the next sign-up page. 5. Create a LocalLux ID. This will be your LocalLux login ID and cannot be changed, so think of one that is secure and easy to remember. 6. Create a LocalLux password. You can change your password at any time. 7. Enter your Password Reset Question and Answer. This can be used at a later time if you forget your password. 8. Enter your e-mail address. You will receive e-mail notification when new information is available in [address] 9. Click Sign Up. You can now view and download portions of your medical record. 10. Click the Download Summary menu link to download a portable copy of your medical information. If you have questions, please visit the Frequently Asked Questions section of the LocalL[employer] website. Remember, [employer] is NOT to be used for urgent needs. For medical emergencies, dial 911. Now available from your iPhone and Android! Please provide this summary of care documentation to your next provider. Your primary care clinician is listed as Dearciro Choi. If you have any questions after today's visit, please call [phone].

## 2018-04-09 NOTE — PROGRESS NOTES
[address] presents today for   Chief Complaint   Patient presents with    Vomiting    Nausea    Diarrhea     x 4 days        Tesfaye Mccoy preferred language for health care discussion is english/other. Is someone accompanying this pt? no    Is the patient using any DME equipment during OV? no    Depression Screening:  PHQ over the last two weeks 4/9/2018 1/2/2018 9/8/2017 8/29/2017   Little interest or pleasure in doing things Not at all Not at all Not at all Not at all   Feeling down, depressed or hopeless Not at all Not at all Not at all Not at all   Total Score PHQ 2 0 0 0 0       Learning Assessment:  Learning Assessment 8/29/2017   PRIMARY LEARNER Patient   HIGHEST LEVEL OF EDUCATION - PRIMARY LEARNER  GRADUATED HIGH SCHOOL OR GED   BARRIERS PRIMARY LEARNER NONE   CO-LEARNER CAREGIVER No   PRIMARY LANGUAGE ENGLISH   LEARNER PREFERENCE PRIMARY LISTENING   ANSWERED BY patient    RELATIONSHIP SELF       Abuse Screening:  n/i    Fall Risk  n/i    Health Maintenance reviewed and discussed per provider. Yes    Tesfaye Mccoy is due for nothing at this time. Please order/place referral if appropriate. Advance Directive:  1. Do you have an advance directive in place? Patient Reply: no    2. If not, would you like material regarding how to put one in place? Patient Reply: no    Coordination of Care:  1. Have you been to the ER, urgent care clinic since your last visit? Hospitalized since your last visit? no    2. Have you seen or consulted any other health care providers outside of the [facility] since your last visit? Include any pap smears or colon screening.  no

## 2018-04-09 NOTE — PROGRESS NOTES
HISTORY OF PRESENT ILLNESS  Sen Allen is a 25 y.o. female. HPI Comments: Patient is able to eat and drink ok. However she feels little neausea after eating. Vomiting    The history is provided by the patient. This is a new problem. The current episode started 2 days ago. Episode frequency: once  The problem has not changed since onset. The emesis has an appearance of stomach contents. There has been no fever. Associated symptoms include diarrhea and cough. Pertinent negatives include no chills, no fever, no sweats, no abdominal pain, no headaches, no arthralgias, no myalgias, no URI and no headaches. The patient is not pregnant. Her pertinent negatives include no irritable bowel syndrome and no inflammatory bowel disease. Nausea    The history is provided by the patient. This is a new problem. The current episode started 2 days ago. The problem occurs 2 to 4 times per day. The emesis has an appearance of stomach contents. There has been no fever. Associated symptoms include diarrhea and cough. Pertinent negatives include no chills, no fever, no sweats, no abdominal pain, no headaches, no arthralgias, no myalgias, no URI and no headaches. The patient is not pregnant. Her pertinent negatives include no irritable bowel syndrome and no inflammatory bowel disease. Diarrhea    The history is provided by the patient. This is a new problem. The current episode started 2 days ago. The problem occurs 2 to 4 times per day. The problem has not changed since onset. There has been no fever. Associated symptoms include vomiting and cough. Pertinent negatives include no abdominal pain, no chills, no sweats, no headaches, no arthralgias, no myalgias and no URI. She has tried nothing for the symptoms. Her past medical history does not include irritable bowel syndrome or inflammatory bowel disease. Review of Systems   Constitutional: Positive for malaise/fatigue. Negative for chills and fever.    HENT: Negative for congestion, sinus pain and sore throat. Eyes: Negative for blurred vision and double vision. Respiratory: Positive for cough. Negative for sputum production and shortness of breath. Cardiovascular: Negative for chest pain and palpitations. Gastrointestinal: Positive for diarrhea, heartburn, nausea and vomiting. Negative for abdominal pain, blood in stool, constipation and melena. Genitourinary: Negative for dysuria, flank pain, frequency, hematuria and urgency. Musculoskeletal: Negative for arthralgias, myalgias and neck pain. Neurological: Negative for dizziness and headaches. Endo/Heme/Allergies: Negative for environmental allergies and polydipsia. Psychiatric/Behavioral: The patient is not nervous/anxious. Physical Exam   Constitutional: She is oriented to person, place, and time. She appears well-developed and well-nourished. No distress. HENT:   Head: Normocephalic and atraumatic. Right Ear: External ear normal.   Left Ear: External ear normal.   Mouth/Throat: Oropharynx is clear and moist. No oropharyngeal exudate. Eyes: EOM are normal. Pupils are equal, round, and reactive to light. Right eye exhibits no discharge. Left eye exhibits no discharge. Neck: Neck supple. Cardiovascular: Normal rate and regular rhythm. Pulmonary/Chest: Breath sounds normal. No respiratory distress. She has no wheezes. Abdominal: Soft. Bowel sounds are normal. She exhibits no distension and no mass. There is no tenderness. There is no rebound, no guarding, no tenderness at McBurney's point and negative Sheikh's sign. Musculoskeletal: Normal range of motion. She exhibits no edema. Lymphadenopathy:     She has no cervical adenopathy. Neurological: She is alert and oriented to person, place, and time. No cranial nerve deficit. Skin: Skin is dry. She is not diaphoretic. Psychiatric: She has a normal mood and affect. Nursing note and vitals reviewed.       ASSESSMENT and PLAN ICD-10-CM ICD-9-CM    1. Bilious vomiting with nausea R11.14 787.04 CBC WITH AUTOMATED DIFF      METABOLIC PANEL, COMPREHENSIVE      CULTURE, STOOL      ondansetron (ZOFRAN ODT) 4 mg disintegrating tablet      pantoprazole (PROTONIX) 40 mg tablet      metoclopramide HCl (REGLAN) 5 mg tablet      CBC WITH AUTOMATED DIFF      METABOLIC PANEL, COMPREHENSIVE      CULTURE, STOOL   2. Diarrhea, unspecified type R19.7 787.91 CBC WITH AUTOMATED DIFF      METABOLIC PANEL, COMPREHENSIVE      CULTURE, STOOL      ondansetron (ZOFRAN ODT) 4 mg disintegrating tablet      pantoprazole (PROTONIX) 40 mg tablet      metoclopramide HCl (REGLAN) 5 mg tablet      CBC WITH AUTOMATED DIFF      METABOLIC PANEL, COMPREHENSIVE      CULTURE, STOOL   patient advised to eat bland diet, avoid, spicy food, caffeine, excess sugar. Drink plenty of fluids. If condition worsens or she is unable to tolerate oral fluids go to ED. Further plan of care will be estalblished according to lab results. Patient verbalized the understanding.

## 2018-04-10 LAB
ALBUMIN SERPL-MCNC: 4.3 G/DL (ref 3.5–5.5)
ALBUMIN/GLOB SERPL: 1.5 {RATIO} (ref 1.2–2.2)
ALP SERPL-CCNC: 82 IU/L (ref 39–117)
ALT SERPL-CCNC: 24 IU/L (ref 0–32)
AST SERPL-CCNC: 17 IU/L (ref 0–40)
BASOPHILS # BLD AUTO: 0 X10E3/UL (ref 0–0.2)
BASOPHILS NFR BLD AUTO: 0 %
BILIRUB SERPL-MCNC: <0.2 MG/DL (ref 0–1.2)
BUN SERPL-MCNC: 13 MG/DL (ref 6–20)
BUN/CREAT SERPL: 26 (ref 9–23)
CALCIUM SERPL-MCNC: 9.2 MG/DL (ref 8.7–10.2)
CHLORIDE SERPL-SCNC: 102 MMOL/L (ref 96–106)
CO2 SERPL-SCNC: 21 MMOL/L (ref 18–29)
CREAT SERPL-MCNC: 0.5 MG/DL (ref 0.57–1)
EOSINOPHIL # BLD AUTO: 0.8 X10E3/UL (ref 0–0.4)
EOSINOPHIL NFR BLD AUTO: 6 %
ERYTHROCYTE [DISTWIDTH] IN BLOOD BY AUTOMATED COUNT: 13.4 % (ref 12.3–15.4)
GFR SERPLBLD CREATININE-BSD FMLA CKD-EPI: 138 ML/MIN/1.73
GFR SERPLBLD CREATININE-BSD FMLA CKD-EPI: 159 ML/MIN/1.73
GLOBULIN SER CALC-MCNC: 2.8 G/DL (ref 1.5–4.5)
GLUCOSE SERPL-MCNC: 108 MG/DL (ref 65–99)
HCT VFR BLD AUTO: 38.9 % (ref 34–46.6)
HGB BLD-MCNC: 12.2 G/DL (ref 11.1–15.9)
IMM GRANULOCYTES # BLD: 0 X10E3/UL (ref 0–0.1)
IMM GRANULOCYTES NFR BLD: 0 %
LYMPHOCYTES # BLD AUTO: 4.3 X10E3/UL (ref 0.7–3.1)
LYMPHOCYTES NFR BLD AUTO: 33 %
MCH RBC QN AUTO: 25.5 PG (ref 26.6–33)
MCHC RBC AUTO-ENTMCNC: 31.4 G/DL (ref 31.5–35.7)
MCV RBC AUTO: 81 FL (ref 79–97)
MONOCYTES # BLD AUTO: 0.7 X10E3/UL (ref 0.1–0.9)
MONOCYTES NFR BLD AUTO: 5 %
NEUTROPHILS # BLD AUTO: 7.1 X10E3/UL (ref 1.4–7)
NEUTROPHILS NFR BLD AUTO: 56 %
PLATELET # BLD AUTO: 402 X10E3/UL (ref 150–379)
POTASSIUM SERPL-SCNC: 4.2 MMOL/L (ref 3.5–5.2)
PROT SERPL-MCNC: 7.1 G/DL (ref 6–8.5)
RBC # BLD AUTO: 4.78 X10E6/UL (ref 3.77–5.28)
SODIUM SERPL-SCNC: 140 MMOL/L (ref 134–144)
WBC # BLD AUTO: 13 X10E3/UL (ref 3.4–10.8)

## 2018-04-13 ENCOUNTER — TELEPHONE (OUTPATIENT)
Dept: INTERNAL MEDICINE CLINIC | Age: 23
End: 2018-04-13

## 2018-04-13 DIAGNOSIS — R19.7 DIARRHEA, UNSPECIFIED TYPE: Primary | ICD-10-CM

## 2018-04-13 DIAGNOSIS — R11.14 BILIOUS VOMITING WITH NAUSEA: ICD-10-CM

## 2018-04-13 RX ORDER — CIPROFLOXACIN 500 MG/1
500 TABLET ORAL 2 TIMES DAILY
Qty: 14 TAB | Refills: 0 | Status: SHIPPED | OUTPATIENT
Start: 2018-04-13 | End: 2018-04-20

## 2018-04-13 RX ORDER — METRONIDAZOLE 500 MG/1
500 TABLET ORAL 2 TIMES DAILY
Qty: 14 TAB | Refills: 0 | Status: SHIPPED | OUTPATIENT
Start: 2018-04-13 | End: 2018-04-20

## 2018-04-18 LAB
CAMPYLOBACTER STL CULT: NORMAL
E COLI SXT STL QL IA: NEGATIVE
SALM + SHIG STL CULT: NORMAL

## 2018-04-21 ENCOUNTER — OFFICE VISIT (OUTPATIENT)
Dept: INTERNAL MEDICINE CLINIC | Age: 23
End: 2018-04-21

## 2018-04-21 ENCOUNTER — HOSPITAL ENCOUNTER (EMERGENCY)
Age: 23
Discharge: HOME | End: 2018-04-21
Attending: EMERGENCY MEDICINE
Payer: COMMERCIAL

## 2018-04-21 ENCOUNTER — APPOINTMENT (OUTPATIENT)
Dept: CT IMAGING | Age: 23
End: 2018-04-21
Attending: EMERGENCY MEDICINE
Payer: COMMERCIAL

## 2018-04-21 VITALS
WEIGHT: 292 LBS | OXYGEN SATURATION: 98 % | HEART RATE: 101 BPM | DIASTOLIC BLOOD PRESSURE: 83 MMHG | RESPIRATION RATE: 16 BRPM | SYSTOLIC BLOOD PRESSURE: 122 MMHG | HEIGHT: 65 IN | BODY MASS INDEX: 48.65 KG/M2 | TEMPERATURE: 97.7 F

## 2018-04-21 VITALS
RESPIRATION RATE: 20 BRPM | WEIGHT: 292 LBS | OXYGEN SATURATION: 100 % | SYSTOLIC BLOOD PRESSURE: 155 MMHG | HEART RATE: 89 BPM | TEMPERATURE: 97.7 F | BODY MASS INDEX: 48.65 KG/M2 | DIASTOLIC BLOOD PRESSURE: 79 MMHG | HEIGHT: 65 IN

## 2018-04-21 DIAGNOSIS — R11.15 NON-INTRACTABLE CYCLICAL VOMITING WITH NAUSEA: ICD-10-CM

## 2018-04-21 DIAGNOSIS — R10.84 ABDOMINAL PAIN, GENERALIZED: Primary | ICD-10-CM

## 2018-04-21 DIAGNOSIS — R19.7 DIARRHEA, UNSPECIFIED TYPE: ICD-10-CM

## 2018-04-21 DIAGNOSIS — R10.9 RIGHT SIDED ABDOMINAL PAIN: Primary | ICD-10-CM

## 2018-04-21 DIAGNOSIS — R11.2 NON-INTRACTABLE VOMITING WITH NAUSEA, UNSPECIFIED VOMITING TYPE: ICD-10-CM

## 2018-04-21 LAB
ALBUMIN SERPL-MCNC: 3.8 G/DL (ref 3.4–5)
ALBUMIN/GLOB SERPL: 1.1 {RATIO} (ref 0.8–1.7)
ALP SERPL-CCNC: 87 U/L (ref 45–117)
ALT SERPL-CCNC: 24 U/L (ref 13–56)
ANION GAP SERPL CALC-SCNC: 5 MMOL/L (ref 3–18)
APPEARANCE UR: CLEAR
AST SERPL-CCNC: 12 U/L (ref 15–37)
BASOPHILS # BLD: 0.1 K/UL (ref 0–0.06)
BASOPHILS NFR BLD: 0 % (ref 0–2)
BILIRUB SERPL-MCNC: 0.2 MG/DL (ref 0.2–1)
BILIRUB UR QL: NEGATIVE
BUN SERPL-MCNC: 11 MG/DL (ref 7–18)
BUN/CREAT SERPL: 19 (ref 12–20)
CALCIUM SERPL-MCNC: 8.5 MG/DL (ref 8.5–10.1)
CHLORIDE SERPL-SCNC: 108 MMOL/L (ref 100–108)
CO2 SERPL-SCNC: 28 MMOL/L (ref 21–32)
COLOR UR: YELLOW
CREAT SERPL-MCNC: 0.59 MG/DL (ref 0.6–1.3)
DIFFERENTIAL METHOD BLD: ABNORMAL
EOSINOPHIL # BLD: 1.2 K/UL (ref 0–0.4)
EOSINOPHIL NFR BLD: 8 % (ref 0–5)
ERYTHROCYTE [DISTWIDTH] IN BLOOD BY AUTOMATED COUNT: 13.4 % (ref 11.6–14.5)
GLOBULIN SER CALC-MCNC: 3.6 G/DL (ref 2–4)
GLUCOSE SERPL-MCNC: 85 MG/DL (ref 74–99)
GLUCOSE UR STRIP.AUTO-MCNC: NEGATIVE MG/DL
HCG UR QL: NEGATIVE
HCT VFR BLD AUTO: 39.1 % (ref 35–45)
HGB BLD-MCNC: 12.5 G/DL (ref 12–16)
HGB UR QL STRIP: NEGATIVE
KETONES UR QL STRIP.AUTO: NEGATIVE MG/DL
LEUKOCYTE ESTERASE UR QL STRIP.AUTO: NEGATIVE
LYMPHOCYTES # BLD: 4.2 K/UL (ref 0.9–3.6)
LYMPHOCYTES NFR BLD: 28 % (ref 21–52)
MCH RBC QN AUTO: 26.8 PG (ref 24–34)
MCHC RBC AUTO-ENTMCNC: 32 G/DL (ref 31–37)
MCV RBC AUTO: 83.9 FL (ref 74–97)
MONOCYTES # BLD: 0.8 K/UL (ref 0.05–1.2)
MONOCYTES NFR BLD: 6 % (ref 3–10)
NEUTS SEG # BLD: 8.7 K/UL (ref 1.8–8)
NEUTS SEG NFR BLD: 58 % (ref 40–73)
NITRITE UR QL STRIP.AUTO: NEGATIVE
PH UR STRIP: 5.5 [PH] (ref 5–8)
PLATELET # BLD AUTO: 387 K/UL (ref 135–420)
PMV BLD AUTO: 10.3 FL (ref 9.2–11.8)
POTASSIUM SERPL-SCNC: 3.7 MMOL/L (ref 3.5–5.5)
PROT SERPL-MCNC: 7.4 G/DL (ref 6.4–8.2)
PROT UR STRIP-MCNC: NEGATIVE MG/DL
RBC # BLD AUTO: 4.66 M/UL (ref 4.2–5.3)
SODIUM SERPL-SCNC: 141 MMOL/L (ref 136–145)
SP GR UR REFRACTOMETRY: 1.03 (ref 1–1.03)
UROBILINOGEN UR QL STRIP.AUTO: 0.2 EU/DL (ref 0.2–1)
WBC # BLD AUTO: 15 K/UL (ref 4.6–13.2)

## 2018-04-21 PROCEDURE — 81003 URINALYSIS AUTO W/O SCOPE: CPT | Performed by: EMERGENCY MEDICINE

## 2018-04-21 PROCEDURE — 81025 URINE PREGNANCY TEST: CPT | Performed by: EMERGENCY MEDICINE

## 2018-04-21 PROCEDURE — 99282 EMERGENCY DEPT VISIT SF MDM: CPT

## 2018-04-21 PROCEDURE — 74011636320 HC RX REV CODE- 636/320: Performed by: EMERGENCY MEDICINE

## 2018-04-21 PROCEDURE — 74177 CT ABD & PELVIS W/CONTRAST: CPT

## 2018-04-21 PROCEDURE — 85025 COMPLETE CBC W/AUTO DIFF WBC: CPT | Performed by: EMERGENCY MEDICINE

## 2018-04-21 PROCEDURE — 80053 COMPREHEN METABOLIC PANEL: CPT | Performed by: EMERGENCY MEDICINE

## 2018-04-21 RX ADMIN — IOPAMIDOL 100 ML: 612 INJECTION, SOLUTION INTRAVENOUS at 20:27

## 2018-04-21 NOTE — ED TRIAGE NOTES
Patient sent by primary to rule out appendicitis. Patient has umbilical pain that radiates to her right lower quadrant.

## 2018-04-21 NOTE — PROGRESS NOTES
SUBJECTIVE:   HPI:  Lazaro Avila is a 25 y.o. female who complains of RLQ pain, N/V/D. Previously reports having 3-4 weeks of nausea, vomiting, diarrhea. Seen by PCP on 4/9/18 for the same. Elevated WBC; Stool culture negative. PCP prescribed Cipro and Flagyl on 4/13/18 (no documentation found) - just finished yesterday. Reports that she started having worsening pain over the past 3-4 days, with dizziness. Has been eating but vomiting afterwards. Trying to drink more fluids. Reports having watery diarrhea. Foul smelling. No recent travel. No sick contacts. LMP: [id number]    ROS:  · General: No fever, chills; no weight weight loss, no night sweats  · Respiratory: No cough, shortness of breath, or wheezing  · Cardiovascular: No chest pain, palpitations, or dyspnea on exertion  · Gastrointestinal:+nausea, +vomiting, + diarrhea, no constipation, no black or bloody stools  · Urinary: No dysuria, no urgency, no frequency, no blood, no discharge  · Musculoskeletal: no muscle weakness, no muscles pain  · Neurological: + dizziness, + headaches, no numbness/tingling of extremities    History reviewed. No pertinent past medical history. History reviewed. No pertinent surgical history. Outpatient Prescriptions Marked as Taking for the 4/21/18 encounter (Office Visit) with Steve Ma,    Medication Sig Dispense Refill    PNV with Ca No.65-Iron Poly-FA 60 mg iron-1 mg cap Take 1 Tab by mouth daily. Allergies   Allergen Reactions    Other Plant, Animal, Environmental Hives     chlorine       OBJECTIVE:  Visit Vitals    /83 (BP 1 Location: Right arm, BP Patient Position: Sitting)    Pulse (!) 101    Temp 97.7 °F (36.5 °C) (Oral)    Resp 16    Ht 5' 5\" (1.651 m)    Wt 292 lb (132.5 kg)    LMP 04/09/2018 (Approximate)    SpO2 98%    BMI 48.59 kg/m2      GEN: awake, alert, orientated, in no acute distress, non-toxic appearing. CV:  The heart sounds are regular in rate and rhythm.   There is a normal S1 and S2. There or no murmurs, rubs, or gallops. Distal pulses are intact and equal. No peripheral edema. LUNGS:  Lung sounds are clear and equal to auscultation throughout all lung fields. BACK: No CVA tenderness, no spasms  ABDOMEN: +bowel sounds, RLQ tenderness, rebound tenderness, no rigidity/guarding  NEURO: CNII-XII grossly intact, normal sensation to light touch, normal reflexes    LABS: 4/10/18 - WBC 13, Stool culture - Negative    ASSESSMENT/PLAN:     1. Right sided abdominal pain - multiple etiologies of RLQ pain, n/v/d - concern for appendicitis, C. Diff infection, ectopic pregnancy. Patient is volume depleted with dizziness and tachycardia. Recommend going to nearest ER for further evaluation and management. Patient understands and is in agreement - she will go to [facility].  Her boyfriend is present and will transport her via POV. Patient understands to follow-up with PCP after ER evaluation for continuity. 2. Non-intractable cyclical vomiting with nausea  3.  Diarrhea, unspecified type

## 2018-04-21 NOTE — MR AVS SNAPSHOT
[address] [place] [address] 
 
 
 [address] [facility] 83 [address] [phone] Patient: Jacob Patrick MRN: [id number] :1995 Visit Information Date & Time Provider Department Dept. Phone Encounter #  
 2018  4:30 PM Lisa MckeonLayar [phone] Follow-up Instructions Return if symptoms worsen or fail to improve. Upcoming Health Maintenance Date Due  
 PAP AKA CERVICAL CYTOLOGY 2016 Influenza Age 5 to Adult 2017 DTaP/Tdap/Td series (4 - Td) 2025 Allergies as of 2018  Review Complete On: 2018 By: Chely Rm LPN Severity Noted Reaction Type Reactions Other Plant, Animal, Environmental  2016    Hives  
 chlorine Current Immunizations  Reviewed on 2018 Name Date DTaP 2015 HPV 2013, 2007 Hep A Vaccine 3/4/2008 Hep B Vaccine 10/27/1999, 1996, 1995 Influenza Vaccine (Quad) PF 2016  2:29 PM  
 MMR 8/15/2000, 9/15/1997 Meningococcal (MCV4P) Vaccine 2013, 2007 Td 8/15/2005 Tdap 2015, 2011 Varicella Virus Vaccine 3/4/2008 Not reviewed this visit You Were Diagnosed With   
  
 Codes Comments Right sided abdominal pain    -  Primary ICD-10-CM: R10.9 ICD-9-CM: 789.09 Non-intractable cyclical vomiting with nausea     ICD-10-CM: G43. A0 ICD-9-CM: 536.2 Diarrhea, unspecified type     ICD-10-CM: R19.7 ICD-9-CM: 787.91 Vitals BP Pulse Temp Resp Height(growth percentile) Weight(growth percentile) 122/83 (BP 1 Location: Right arm, BP Patient Position: Sitting) (!) 101 97.7 °F (36.5 °C) (Oral) 16 5' 5\" (1.651 m) 292 lb (132.5 kg) LMP SpO2 BMI OB Status Smoking Status 2018 (Approximate) 98% 48.59 kg/m2 Having regular periods Never Smoker BMI and BSA Data Body Mass Index Body Surface Area  48.59 kg/m 2 2.47 m 2  
  
  
 Preferred Pharmacy Pharmacy Name Phone [id number] [address] [address] [facility] [phone] Your Updated Medication List  
  
   
This list is accurate as of 4/21/18  5:05 PM.  Always use your most recent med list.  
  
  
  
  
 HYDROcodone-acetaminophen 5-500 mg per tablet Commonly known as:  [address] Take 1 Tab by mouth every eight (8) hours as needed for Pain. ibuprofen 800 mg tablet Commonly known as:  MOTRIN Take 1 Tab by mouth every eight (8) hours as needed for Pain. MINASTRIN 24 FE 1 mg-20 mcg(24) /75 mg (4) Chew Generic drug:  norethindrone-e.estradiol-iron  
  
 pantoprazole 40 mg tablet Commonly known as:  PROTONIX Take 1 Tab by mouth daily for 30 days. PNV with Ca No.65-Iron Poly-FA 60 mg iron-1 mg Cap Take 1 Tab by mouth daily. Follow-up Instructions Return if symptoms worsen or fail to improve. Patient Instructions Go to nearest ER now for further evaluation. Introducing [facility]! Dear Amber Lindo: Thank you for requesting a [employer] account. Our records indicate that you already have an active [employer] account. You can access your account anytime at [email or web address] Did you know that you can access your hospital and ER discharge instructions at any time in [employer]? You can also review all of your test results from your hospital stay or ER visit. Additional Information If you have questions, please visit the Frequently Asked Questions section of the [employer] website at [email or web address] Remember, [employer] is NOT to be used for urgent needs. For medical emergencies, dial 911. Now available from your iPhone and Android! Please provide this summary of care documentation to your next provider. Your primary care clinician is listed as Love Choi. If you have any questions after today's visit, please call [phone].

## 2018-04-21 NOTE — PROGRESS NOTES
Patient presents to the clinic for right lower abdominal pain that began 1 week ago. Patient complains nausea and vomiting and diarrhea and migraine headaches. Patient states she tried prescribed medication and received no relief.

## 2018-04-21 NOTE — ED PROVIDER NOTES
EMERGENCY DEPARTMENT HISTORY AND PHYSICAL EXAM    6:55 PM      Date: 4/21/2018  Patient Name: Sen Allen    History of Presenting Illness     Chief Complaint   Patient presents with    Abdominal Pain         History Provided By: Patient    Chief Complaint: Abdominal pain  Duration:  Days (x2-3)  Timing:  Intermittent  Location: Umbilical, RLQ  Quality: Sharp  Severity: N/A  Modifying Factors: Exacerbated by movement  Associated Symptoms: exhibits nausea, emesis, diarrhea, dizziness and weakness but denies blood in stool, fever, dysuria, hematuria, vaginal bleeding, and vaginal discharge      Additional History (Context): Sen Allen is a 25 y.o. female with No significant past medical history who presents to the ED c/o intermittent, sharp abdominal pain onset 2-3 days ago. Pt was seen at her PCP office PTA and was advised to come to the ED to rule put appendicitis. Pt began having multiple episodes of nausea, emesis, and diarrhea one month ago and saw her PCP for it about 11 days ago; pt had labs performed and was prescribed nausea medication and antibiotics that she finished 3 days ago. Pt had no imaging done at that visit. Nausea, emesis, and diarrhea has persisted however it is now accompanied by abdominal pain that began 2-3 days ago. Diarrhea has been a mix of lose and watery but is described as watery now. Abdominal pain radiates from the umbilical region to RLQ and it is exacerbated by movement. Pt additionally exhibits dizziness and weakness but denies blood in stool, fever, dysuria, hematuria, vaginal bleeding, and vaginal discharge. Pt additionally denies recent travel and hx of abdominal surgery LNMP was 11 days ago. PCP: David Willard NP    Current Outpatient Prescriptions   Medication Sig Dispense Refill    pantoprazole (PROTONIX) 40 mg tablet Take 1 Tab by mouth daily for 30 days.  30 Tab 0    MINASTRIN 24 FE 1 mg-20 mcg(24) /75 mg (4) chew       PNV with Ca No.65-Iron Poly-FA 60 mg iron-1 mg cap Take 1 Tab by mouth daily.  ibuprofen (MOTRIN) 800 mg tablet Take 1 Tab by mouth every eight (8) hours as needed for Pain. 30 Tab 0    HYDROcodone-acetaminophen (LORTAB) 5-500 mg per tablet Take 1 Tab by mouth every eight (8) hours as needed for Pain. 20 Tab 0       Past History     Past Medical History:  History reviewed. No pertinent past medical history. Past Surgical History:  History reviewed. No pertinent surgical history. Family History:  Family History   Problem Relation Age of Onset    No Known Problems Mother     Diabetes Father        Social History:  Social History   Substance Use Topics    Smoking status: Never Smoker    Smokeless tobacco: Never Used    Alcohol use No       Allergies: Allergies   Allergen Reactions    Other Plant, Animal, Environmental Hives     chlorine         Review of Systems     Review of Systems   Constitutional: Negative for fever. Gastrointestinal: Positive for abdominal pain, diarrhea, nausea and vomiting. Negative for blood in stool. Genitourinary: Negative for dysuria, hematuria, vaginal bleeding and vaginal discharge. Neurological: Positive for dizziness and weakness. All other systems reviewed and are negative. Physical Exam     Visit Vitals    BP (!) 162/95 (BP 1 Location: Right arm, BP Patient Position: Sitting)    Pulse 97    Temp 97.7 °F (36.5 °C)    Resp 20    Ht 5' 5\" (1.651 m)    Wt 132.5 kg (292 lb)    LMP 04/09/2018 (Approximate)    SpO2 100%    BMI 48.59 kg/m2       Physical Exam   Constitutional: She is oriented to person, place, and time. She appears well-developed and well-nourished. HENT:   Head: Normocephalic and atraumatic. Neck: Neck supple. No JVD present. Cardiovascular: Normal rate and regular rhythm. Pulmonary/Chest: Effort normal and breath sounds normal. No respiratory distress. Abdominal: Soft. She exhibits no distension. There is tenderness. There is guarding. There is no rebound.    Mild RLQ tenderness  Mild guarding   Musculoskeletal: She exhibits no edema. Neurological: She is alert and oriented to person, place, and time. Skin: Skin is warm and dry. No erythema. Psychiatric: Judgment normal.         Diagnostic Study Results     Labs -  Recent Results (from the past 12 hour(s))   URINALYSIS W/ RFLX MICROSCOPIC    Collection Time: 04/21/18  6:25 PM   Result Value Ref Range    Color YELLOW      Appearance CLEAR      Specific gravity 1.028 1.003 - 1.030      pH (UA) 5.5 5.0 - 8.0      Protein NEGATIVE  NEG mg/dL    Glucose NEGATIVE  NEG mg/dL    Ketone NEGATIVE  NEG mg/dL    Bilirubin NEGATIVE  NEG      Blood NEGATIVE  NEG      Urobilinogen 0.2 0.2 - 1.0 EU/dL    Nitrites NEGATIVE  NEG      Leukocyte Esterase NEGATIVE  NEG     HCG URINE, QL    Collection Time: 04/21/18  6:25 PM   Result Value Ref Range    HCG urine, QL NEGATIVE  NEG     CBC WITH AUTOMATED DIFF    Collection Time: 04/21/18  6:35 PM   Result Value Ref Range    WBC 15.0 (H) 4.6 - 13.2 K/uL    RBC 4.66 4.20 - 5.30 M/uL    HGB 12.5 12.0 - 16.0 g/dL    HCT 39.1 35.0 - 45.0 %    MCV 83.9 74.0 - 97.0 FL    MCH 26.8 24.0 - 34.0 PG    MCHC 32.0 31.0 - 37.0 g/dL    RDW 13.4 11.6 - 14.5 %    PLATELET 230 559 - 319 K/uL    MPV 10.3 9.2 - 11.8 FL    NEUTROPHILS 58 40 - 73 %    LYMPHOCYTES 28 21 - 52 %    MONOCYTES 6 3 - 10 %    EOSINOPHILS 8 (H) 0 - 5 %    BASOPHILS 0 0 - 2 %    ABS. NEUTROPHILS 8.7 (H) 1.8 - 8.0 K/UL    ABS. LYMPHOCYTES 4.2 (H) 0.9 - 3.6 K/UL    ABS. MONOCYTES 0.8 0.05 - 1.2 K/UL    ABS. EOSINOPHILS 1.2 (H) 0.0 - 0.4 K/UL    ABS.  BASOPHILS 0.1 (H) 0.0 - 0.06 K/UL    DF AUTOMATED     METABOLIC PANEL, COMPREHENSIVE    Collection Time: 04/21/18  6:35 PM   Result Value Ref Range    Sodium 141 136 - 145 mmol/L    Potassium 3.7 3.5 - 5.5 mmol/L    Chloride 108 100 - 108 mmol/L    CO2 28 21 - 32 mmol/L    Anion gap 5 3.0 - 18 mmol/L    Glucose 85 74 - 99 mg/dL    BUN 11 7.0 - 18 MG/DL    Creatinine 0.59 (L) 0.6 - 1.3 MG/DL BUN/Creatinine ratio 19 12 - 20      GFR est AA >60 >60 ml/min/1.73m2    GFR est non-AA >60 >60 ml/min/1.73m2    Calcium 8.5 8.5 - 10.1 MG/DL    Bilirubin, total 0.2 0.2 - 1.0 MG/DL    ALT (SGPT) 24 13 - 56 U/L    AST (SGOT) 12 (L) 15 - 37 U/L    Alk. phosphatase 87 45 - 117 U/L    Protein, total 7.4 6.4 - 8.2 g/dL    Albumin 3.8 3.4 - 5.0 g/dL    Globulin 3.6 2.0 - 4.0 g/dL    A-G Ratio 1.1 0.8 - 1.7       Mild Leukocytosis    Radiologic Studies -   CT ABD PELV W CONT      Final Results:    Findings: There are a few nonspecific subcentimeter mesenteric lymph nodes including a few in the right lower quadrant. Although nonspecific, these can be seen in the setting of mesenteric adenitis. No other CT findings to explain RLQ pain. Normal appendix and colon. No renal calculi. No adnexal mass. Small uncomplicated fat containing hernia. Medical Decision Making   I am the first provider for this patient. I reviewed the vital signs, available nursing notes, past medical history, past surgical history, family history and social history. Vital Signs-Reviewed the patient's vital signs. Pulse Oximetry Analysis -  100% on room air    Cardiac Monitor:  Rate: 101  Rhythm:  Sinus Tachycardia     Records Reviewed: Nursing Notes and Old Medical Records (Time of Review: 6:55 PM)    ED Course: Progress Notes, Reevaluation, and Consults:    Provider Notes (Medical Decision Making): 24 y/o female presents with RLQ pain, N/V/D.   ?cdiff, will check. Pt reporting rebound, although clinically appears comfortable, but will consider ct depending on labs. Doubt torsion due to duration of sxs. Discussed results with pt. She was unable to provide stool sample here. Advised pcp follow up. Also GI referral.       Diagnosis     Clinical Impression:   1. Abdominal pain, generalized    2. Non-intractable vomiting with nausea, unspecified vomiting type    3.  Diarrhea, unspecified type        Disposition: Discharge    Follow-up Information     None           Patient's Medications   Start Taking    No medications on file   Continue Taking    HYDROCODONE-ACETAMINOPHEN (LORTAB) 5-500 MG PER TABLET    Take 1 Tab by mouth every eight (8) hours as needed for Pain. IBUPROFEN (MOTRIN) 800 MG TABLET    Take 1 Tab by mouth every eight (8) hours as needed for Pain. MINASTRIN 24 FE 1 MG-20 MCG(24) /75 MG (4) CHEW        PANTOPRAZOLE (PROTONIX) 40 MG TABLET    Take 1 Tab by mouth daily for 30 days. PNV WITH CA NO.65-IRON POLY-FA 60 MG IRON-1 MG CAP    Take 1 Tab by mouth daily.    These Medications have changed    No medications on file   Stop Taking    No medications on file     _______________________________

## 2018-04-22 NOTE — ED NOTES
10:33 PM  04/21/18     Discharge instructions given to patient (name) with verbalization of understanding. Patient accompanied by significant other. Patient discharged with the following prescriptions none. Patient discharged to home (destination).       Yvon Gonsales RN

## 2018-04-22 NOTE — DISCHARGE INSTRUCTIONS
Abdominal Pain: Care Instructions  Your Care Instructions    Abdominal pain has many possible causes. Some aren't serious and get better on their own in a few days. Others need more testing and treatment. If your pain continues or gets worse, you need to be rechecked and may need more tests to find out what is wrong. You may need surgery to correct the problem. Don't ignore new symptoms, such as fever, nausea and vomiting, urination problems, pain that gets worse, and dizziness. These may be signs of a more serious problem. Your doctor may have recommended a follow-up visit in the next 8 to 12 hours. If you are not getting better, you may need more tests or treatment. The doctor has checked you carefully, but problems can develop later. If you notice any problems or new symptoms, get medical treatment right away. Follow-up care is a key part of your treatment and safety. Be sure to make and go to all appointments, and call your doctor if you are having problems. It's also a good idea to know your test results and keep a list of the medicines you take. How can you care for yourself at home? · Rest until you feel better. · To prevent dehydration, drink plenty of fluids, enough so that your urine is light yellow or clear like water. Choose water and other caffeine-free clear liquids until you feel better. If you have kidney, heart, or liver disease and have to limit fluids, talk with your doctor before you increase the amount of fluids you drink. · If your stomach is upset, eat mild foods, such as rice, dry toast or crackers, bananas, and applesauce. Try eating several small meals instead of two or three large ones. · Wait until 48 hours after all symptoms have gone away before you have spicy foods, alcohol, and drinks that contain caffeine. · Do not eat foods that are high in fat. · Avoid anti-inflammatory medicines such as aspirin, ibuprofen (Advil, Motrin), and naproxen (Aleve).  These can cause stomach upset. Talk to your doctor if you take daily aspirin for another health problem. When should you call for help? Call 911 anytime you think you may need emergency care. For example, call if:  ? · You passed out (lost consciousness). ? · You pass maroon or very bloody stools. ? · You vomit blood or what looks like coffee grounds. ? · You have new, severe belly pain. ?Call your doctor now or seek immediate medical care if:  ? · Your pain gets worse, especially if it becomes focused in one area of your belly. ? · You have a new or higher fever. ? · Your stools are black and look like tar, or they have streaks of blood. ? · You have unexpected vaginal bleeding. ? · You have symptoms of a urinary tract infection. These may include:  ¨ Pain when you urinate. ¨ Urinating more often than usual.  ¨ Blood in your urine. ? · You are dizzy or lightheaded, or you feel like you may faint. ? Watch closely for changes in your health, and be sure to contact your doctor if:  ? · You are not getting better after 1 day (24 hours). Where can you learn more? Go to http://devenPeckforton Pharmaceuticalsemigdio.info/. Enter O394 in the search box to learn more about \"Abdominal Pain: Care Instructions. \"  Current as of: March 20, 2017  Content Version: 11.4  © 6586-4071 [employer]. Care instructions adapted under license by [employer] (which disclaims liability or warranty for this information). If you have questions about a medical condition or this instruction, always ask your healthcare professional. [place] [address] any warranty or liability for your use of this information. Diarrhea: Care Instructions  Your Care Instructions    Diarrhea is loose, watery stools (bowel movements). The exact cause is often hard to find. Sometimes diarrhea is your body's way of getting rid of what caused an upset stomach.  Viruses, food poisoning, and many medicines can cause diarrhea. Some people get diarrhea in response to emotional stress, anxiety, or certain foods. Almost everyone has diarrhea now and then. It usually isn't serious, and your stools will return to normal soon. The important thing to do is replace the fluids you have lost, so you can prevent dehydration. The doctor has checked you carefully, but problems can develop later. If you notice any problems or new symptoms, get medical treatment right away. Follow-up care is a key part of your treatment and safety. Be sure to make and go to all appointments, and call your doctor if you are having problems. It's also a good idea to know your test results and keep a list of the medicines you take. How can you care for yourself at home? · Watch for signs of dehydration, which means your body has lost too much water. Dehydration is a serious condition and should be treated right away. Signs of dehydration are:  ¨ Increasing thirst and dry eyes and mouth. ¨ Feeling faint or lightheaded. ¨ Darker urine, and a smaller amount of urine than normal.  · To prevent dehydration, drink plenty of fluids, enough so that your urine is light yellow or clear like water. Choose water and other caffeine-free clear liquids until you feel better. If you have kidney, heart, or liver disease and have to limit fluids, talk with your doctor before you increase the amount of fluids you drink. · Begin eating small amounts of mild foods the next day, if you feel like it. ¨ Try yogurt that has live cultures of Lactobacillus. (Check the label.)  ¨ Avoid spicy foods, fruits, alcohol, and caffeine until 48 hours after all symptoms are gone. ¨ Avoid chewing gum that contains sorbitol. ¨ Avoid dairy products (except for yogurt with Lactobacillus) while you have diarrhea and for 3 days after symptoms are gone. · The doctor may recommend that you take over-the-counter medicine, such as loperamide (Imodium), if you still have diarrhea after 6 hours. Read and follow all instructions on the label. Do not use this medicine if you have bloody diarrhea, a high fever, or other signs of serious illness. Call your doctor if you think you are having a problem with your medicine. When should you call for help? Call 911 anytime you think you may need emergency care. For example, call if:  ? · You passed out (lost consciousness). ? · Your stools are maroon or very bloody. ?Call your doctor now or seek immediate medical care if:  ? · You are dizzy or lightheaded, or you feel like you may faint. ? · Your stools are black and look like tar, or they have streaks of blood. ? · You have new or worse belly pain. ? · You have symptoms of dehydration, such as:  ¨ Dry eyes and a dry mouth. ¨ Passing only a little dark urine. ¨ Feeling thirstier than usual.   ? · You have a new or higher fever. ? Watch closely for changes in your health, and be sure to contact your doctor if:  ? · Your diarrhea is getting worse. ? · You see pus in the diarrhea. ? · You are not getting better after 2 days (48 hours). Where can you learn more? Go to http://deven-emigdio.info/. Enter N742 in the search box to learn more about \"Diarrhea: Care Instructions. \"  Current as of: March 20, 2017  Content Version: 11.4  © 6911-3821 "[employer]. Care instructions adapted under license by [employer] (which disclaims liability or warranty for this information). If you have questions about a medical condition or this instruction, always ask your healthcare professional. [place] [address] any warranty or liability for your use of this information. Nausea and Vomiting: Care Instructions  Your Care Instructions    When you are nauseated, you may feel weak and sweaty and notice a lot of saliva in your mouth. Nausea often leads to vomiting.  Most of the time you do not need to worry about nausea and vomiting, but they can be signs of other illnesses. Two common causes of nausea and vomiting are stomach flu and food poisoning. Nausea and vomiting from viral stomach flu will usually start to improve within 24 hours. Nausea and vomiting from food poisoning may last from 12 to 48 hours. The doctor has checked you carefully, but problems can develop later. If you notice any problems or new symptoms, get medical treatment right away. Follow-up care is a key part of your treatment and safety. Be sure to make and go to all appointments, and call your doctor if you are having problems. It's also a good idea to know your test results and keep a list of the medicines you take. How can you care for yourself at home? · To prevent dehydration, drink plenty of fluids, enough so that your urine is light yellow or clear like water. Choose water and other caffeine-free clear liquids until you feel better. If you have kidney, heart, or liver disease and have to limit fluids, talk with your doctor before you increase the amount of fluids you drink. · Rest in bed until you feel better. · When you are able to eat, try clear soups, mild foods, and liquids until all symptoms are gone for 12 to 48 hours. Other good choices include dry toast, crackers, cooked cereal, and gelatin dessert, such as Jell-O. When should you call for help? Call 911 anytime you think you may need emergency care. For example, call if:  ? · You passed out (lost consciousness). ?Call your doctor now or seek immediate medical care if:  ? · You have symptoms of dehydration, such as:  ¨ Dry eyes and a dry mouth. ¨ Passing only a little dark urine. ¨ Feeling thirstier than usual.   ? · You have new or worsening belly pain. ? · You have a new or higher fever. ? · You vomit blood or what looks like coffee grounds. ? Watch closely for changes in your health, and be sure to contact your doctor if:  ? · You have ongoing nausea and vomiting. ? · Your vomiting is getting worse.    ? · Your vomiting lasts longer than 2 days. ? · You are not getting better as expected. Where can you learn more? Go to http://deven-emigdio.info/. Enter 25 [id number] in the search box to learn more about \"Nausea and Vomiting: Care Instructions. \"  Current as of: March 20, 2017  Content Version: 11.4  © 0797-3701 [employer]. Care instructions adapted under license by [employer] (which disclaims liability or warranty for this information). If you have questions about a medical condition or this instruction, always ask your healthcare professional. [place] [address] any warranty or liability for your use of this information.

## 2018-06-02 DIAGNOSIS — R19.7 DIARRHEA, UNSPECIFIED TYPE: ICD-10-CM

## 2018-06-02 DIAGNOSIS — R11.14 BILIOUS VOMITING WITH NAUSEA: ICD-10-CM

## 2018-06-04 RX ORDER — PANTOPRAZOLE SODIUM 40 MG/1
TABLET, DELAYED RELEASE ORAL
Qty: 30 TAB | Refills: 0 | Status: SHIPPED | OUTPATIENT
Start: 2018-06-04 | End: 2018-08-09

## 2018-08-09 ENCOUNTER — HOSPITAL ENCOUNTER (OUTPATIENT)
Dept: LAB | Age: 23
Discharge: HOME | End: 2018-08-09

## 2018-08-09 ENCOUNTER — OFFICE VISIT (OUTPATIENT)
Dept: INTERNAL MEDICINE CLINIC | Age: 23
End: 2018-08-09

## 2018-08-09 VITALS
TEMPERATURE: 97.7 F | BODY MASS INDEX: 48.15 KG/M2 | WEIGHT: 289 LBS | HEIGHT: 65 IN | DIASTOLIC BLOOD PRESSURE: 66 MMHG | OXYGEN SATURATION: 97 % | HEART RATE: 109 BPM | RESPIRATION RATE: 20 BRPM | SYSTOLIC BLOOD PRESSURE: 133 MMHG

## 2018-08-09 DIAGNOSIS — Z00.00 ROUTINE GENERAL MEDICAL EXAMINATION AT A HEALTH CARE FACILITY: Primary | ICD-10-CM

## 2018-08-09 DIAGNOSIS — Z11.1 SCREENING-PULMONARY TB: ICD-10-CM

## 2018-08-09 PROCEDURE — 99001 SPECIMEN HANDLING PT-LAB: CPT | Performed by: INTERNAL MEDICINE

## 2018-08-09 RX ORDER — BISMUTH SUBSALICYLATE 262 MG
1 TABLET,CHEWABLE ORAL DAILY
COMMUNITY

## 2018-08-09 NOTE — MR AVS SNAPSHOT
[facility]– [place] 
 
 
 [address] [facility] [id number] 
[phone] Patient: Loulou Garcia MRN: [id number] :1995 Visit Information Date & Time Provider Department Dept. Phone Encounter #  
 2018  2:00 PM Donna Mishra MD [phone] [id number] Follow-up Instructions Return if symptoms worsen or fail to improve. Upcoming Health Maintenance Date Due  
 PAP AKA CERVICAL CYTOLOGY 2016 Influenza Age 5 to Adult 2018 DTaP/Tdap/Td series (4 - Td) 2025 Allergies as of 2018  Review Complete On: 2018 By: Donna Mishra MD  
  
 Severity Noted Reaction Type Reactions Other Plant, Animal, Environmental  2016    Hives, Shortness of Breath, Nausea and Vomiting  
 chlorine Current Immunizations  Reviewed on 2018 Name Date DTaP 2015 HPV 2013, 2007 Hep A Vaccine 3/4/2008 Hep B Vaccine 10/27/1999, 1996, 1995 Influenza Vaccine (Quad) PF 2016  2:29 PM  
 MMR 8/15/2000, 9/15/1997 Meningococcal (MCV4P) Vaccine 2013, 2007 Td 8/15/2005 Tdap 2015, 2011 Varicella Virus Vaccine 3/4/2008 Not reviewed this visit You Were Diagnosed With   
  
 Codes Comments Screening-pulmonary TB    -  Primary ICD-10-CM: Z11.1 ICD-9-CM: V74.1 Vitals BP Pulse Temp Resp Height(growth percentile) Weight(growth percentile) 133/66 (BP 1 Location: Right arm, BP Patient Position: Sitting) (!) 109 97.7 °F (36.5 °C) (Oral) 20 5' 5\" (1.651 m) 289 lb (131.1 kg) LMP SpO2 BMI OB Status Smoking Status 2018 97% 48.09 kg/m2 Having regular periods Never Smoker BMI and BSA Data Body Mass Index Body Surface Area 48.09 kg/m 2 2.45 m 2 Preferred Pharmacy Pharmacy Name Phone [facility] [phone] Your Updated Medication List  
  
   
This list is accurate as of 8/9/18  2:51 PM.  Always use your most recent med list.  
  
  
  
  
 multivitamin tablet Commonly known as:  ONE A DAY Take 1 Tab by mouth daily. Follow-up Instructions Return if symptoms worsen or fail to improve. To-Do List   
 08/09/2018 Lab:  QUANTIFERON TB GOLD(CLIENT INCUB.) Patient Instructions 1) follow-up as needed or sooner if worsening symptoms. Introducing [facility]! Dear Avinash Kaye: Thank you for requesting a [employer] account. Our records indicate that you already have an active mWater account. You can access your account anytime at [email or web address] Did you know that you can access your hospital and ER discharge instructions at any time in mWater? You can also review all of your test results from your hospital stay or ER visit. Additional Information If you have questions, please visit the Frequently Asked Questions section of the mWater website at [email or web address] Remember, mWater is NOT to be used for urgent needs. For medical emergencies, dial 911. Now available from your iPhone and Android! Please provide this summary of care documentation to your next provider. Your primary care clinician is listed as Virginia Choi. If you have any questions after today's visit, please call [phone].

## 2018-08-09 NOTE — PROGRESS NOTES
Chief Complaint   Patient presents with   [address] [place] [address]    Complete Physical         HPI:     Valentino Gallardo is a 25 y.o.  female with history of  anemia who presents for complete physical exam. She needs a CPE and form filled out for paramedics school and blood test to check for TB. She denies any chest pain, shortness of breath, abdominal pain, headaches or dizziness. Past Medical History:   Diagnosis Date    Anemia     Headache        History reviewed. No pertinent surgical history. MEDICATION ALLERGIES/INTOLERANCES:   Allergies   Allergen Reactions    Other Plant, Animal, Environmental Hives, Shortness of Breath and Nausea and Vomiting     chlorine             CURRENT MEDICATIONS:    Current Outpatient Prescriptions   Medication Sig    multivitamin (ONE A DAY) tablet Take 1 Tab by mouth daily. No current facility-administered medications for this visit. Health Maintenance   Topic Date Due    PAP AKA CERVICAL CYTOLOGY  08/23/2016    Influenza Age 5 to Adult  08/01/2018    DTaP/Tdap/Td series (4 - Td) 12/21/2025    HPV Age 9Y-34Y  Completed         FAMILY HISTORY:   Family History   Problem Relation Age of Onset    Hypertension Mother     Diabetes Father        SOCIAL HISTORY:   She  reports that she has never smoked. She has never used smokeless tobacco.  She  reports that she does not drink alcohol.       HEALTH MAINTENANCE AND SCREENING:  DTAP: 2015    ROS:   General: negative for - chills, fatigue, fever, weight loss or weight gain, night sweats  HEENT: negative for - no sore throat, nasal congestion, vision problems or ear problems  Resp: negative for - cough, shortness of breath or wheezing  CV: negative for - chest pain, palpitations, orthopnea or PND,  GI: negative for - abdominal pain, change in bowel habits, constipation, diarrhea, blood or black tarry stools, or nausea/vomiting  : negative for - dysuria, hematuria, incontinence, pelvic pain or vulvar/vaginal symptoms  Heme: negative for -excessive bleeding or bruising  Endo: negative for - hot flashes, polydipsia/polyuria or hot or cold intolerance  MSK: negative for - joint pain, joint swelling or muscle pain  Neuro: negative for - numbness, tingling, headache or dizziness  Derm: negative for - dry skin, hair changes, rash or skin lesion changes  Psych: negative for - anxiety, depression, irritability or mood swings or insomnia    OBJECTIVE:  PHYSICAL EXAM: Vitals:   Vitals:    08/09/18 1410   BP: 133/66   Pulse: (!) 109   Resp: 20   Temp: 97.7 °F (36.5 °C)   TempSrc: Oral   SpO2: 97%   Weight: 289 lb (131.1 kg)   Height: 5' 5\" (1.651 m)     Generally: Pleasant female in no acute distress    HEENT exam: Head: atraumatic     Eyes: Pupils equally round and reactive to light, Fundoscopic exam is                       normal               Ears: bilaterally: Normal tympanic membrane, no erythema or exudate,                         normal light Reflex    Nares: moist mucosa, no erythema               Mouth: clear, no erythema or exudate     Neck: supple, no lymphadenopathy, negative thyromegaly, negative                                   carotid bruits bilaterally    Cardiac exam: regular, rate, and rhythm. No murmurs, gallops, or rubs. Normal S1 and S2.    Pulmonary exam: Clear to ausculation bilaterally    Abdominal exam: Positive bowel sounds in all four quadrants, soft, nondistended, nontender. No hepatosplenomegaly. Extremities: 2+ dorsalis pedis bilaterally. No pedal edema bilaterally. Musculoskeletal exam: 5 out of 5 strength in upper and lower extremities bilaterally. Good range of motion in upper and lower extremities. 2 out of 2 reflexes in upper and lower extremities bilaterally. Neurological exam: Cranial nerves II-XII all intact. Normal sensation in upper and lower extremities. Normal gait.                LABS/RADIOLOGICAL TESTS:   Lab Results   Component Value Date/Time    WBC 15.0 (H) 04/21/2018 06:35 PM    HGB 12.5 04/21/2018 06:35 PM    HCT 39.1 04/21/2018 06:35 PM    PLATELET 940 35/23/6101 06:35 PM     Lab Results   Component Value Date/Time    Sodium 141 04/21/2018 06:35 PM    Potassium 3.7 04/21/2018 06:35 PM    Chloride 108 04/21/2018 06:35 PM    CO2 28 04/21/2018 06:35 PM    Glucose 85 04/21/2018 06:35 PM    BUN 11 04/21/2018 06:35 PM    Creatinine 0.59 (L) 04/21/2018 06:35 PM     Lab Results   Component Value Date/Time    Cholesterol, total 193 09/08/2017 10:20 AM    HDL Cholesterol 66 09/08/2017 10:20 AM    LDL, calculated 110 (H) 09/08/2017 10:20 AM    Triglyceride 85 09/08/2017 10:20 AM     No results found for: GPT    Previous labs      ASSESSMENT/PLAN:      ICD-10-CM ICD-9-CM    1. Routine general medical examination at a health care facility Z00.00 V70.0 Stable. 2. Screening-pulmonary TB Z11.1 V74.1 QUANTIFERON TB GOLD(CLIENT INCUB.)      QUANTIFERON TB GOLD(CLIENT INCUB.)     3. She will send us the form and will fill out. 4. Patient verbalized understanding and agreement with the plan. 5. Patient was given after visit summary. 6. Follow-up Disposition:  Return if symptoms worsen or fail to improve. or sooner if worsening symptoms.         Markos Leija MD

## 2018-08-09 NOTE — PROGRESS NOTES
ROOM # 1  Identified pt with two pt identifiers(name and ). Reviewed record in preparation for visit and have obtained necessary documentation. Chief Complaint   Patient presents with   [id number] [facility] preferred language for health care discussion is english/other. Is the patient using any DME equipment during OV? Nata Alexis is due for:  Health Maintenance Due   Topic    PAP AKA CERVICAL CYTOLOGY     Influenza Age 5 to Adult      Health Maintenance reviewed and discussed per provider  Please order/place referral if appropriate. Advance Directive:  1. Do you have an advance directive in place? Patient Reply: NO    2. If not, would you like material regarding how to put one in place? NO    Coordination of Care:  1. Have you been to the ER, urgent care clinic since your last visit? Hospitalized since your last visit? NO    2. Have you seen or consulted any other health care providers outside of the [address] [place] [address] since your last visit? Include any pap smears or colon screening. NO    Patient is accompanied by self I have received verbal consent from Bennett Bhagat to discuss any/all medical information while they are present in the room.     Learning Assessment:  Learning Assessment 2017   PRIMARY LEARNER Patient   HIGHEST LEVEL OF EDUCATION - PRIMARY LEARNER  GRADUATED HIGH SCHOOL OR GED   BARRIERS PRIMARY LEARNER NONE   CO-LEARNER CAREGIVER No   PRIMARY LANGUAGE ENGLISH   LEARNER PREFERENCE PRIMARY LISTENING   ANSWERED BY patient    RELATIONSHIP SELF     Depression Screening:  PHQ over the last two weeks 2018   Little interest or pleasure in doing things Not at all Not at all Not at all Not at all Not at all Not at all Not at all   Feeling down, depressed, irritable, or hopeless Not at all Not at all Not at all Not at all Not at all Not at all Not at all   Total Score PHQ 2 0 0 0 0 0 0 0     Abuse Screening:  No flowsheet data found. Fall Risk  No flowsheet data found.

## 2018-08-14 LAB
ANNOTATION COMMENT IMP: NORMAL
GAMMA INTERFERON BACKGROUND BLD IA-ACNC: 0.03 IU/ML
M TB IFN-G BLD-IMP: NEGATIVE
M TB IFN-G CD4+ BCKGRND COR BLD-ACNC: 0.04 IU/ML
M TB IFN-G CD4+ T-CELLS BLD-ACNC: 0.07 IU/ML
MITOGEN IGNF BLD-ACNC: 7.17 IU/ML
SERVICE CMNT-IMP: NORMAL

## 2018-08-15 ENCOUNTER — TELEPHONE (OUTPATIENT)
Dept: INTERNAL MEDICINE CLINIC | Age: 23
End: 2018-08-15

## 2018-09-26 ENCOUNTER — OFFICE VISIT (OUTPATIENT)
Dept: INTERNAL MEDICINE CLINIC | Age: 23
End: 2018-09-26

## 2018-09-26 VITALS
HEART RATE: 95 BPM | TEMPERATURE: 97.8 F | DIASTOLIC BLOOD PRESSURE: 84 MMHG | SYSTOLIC BLOOD PRESSURE: 131 MMHG | RESPIRATION RATE: 20 BRPM | BODY MASS INDEX: 48.65 KG/M2 | OXYGEN SATURATION: 98 % | WEIGHT: 292 LBS | HEIGHT: 65 IN

## 2018-09-26 DIAGNOSIS — F33.1 MODERATE EPISODE OF RECURRENT MAJOR DEPRESSIVE DISORDER (HCC): Primary | ICD-10-CM

## 2018-09-26 RX ORDER — SERTRALINE HYDROCHLORIDE 50 MG/1
50 TABLET, FILM COATED ORAL DAILY
Qty: 30 TAB | Refills: 0 | Status: SHIPPED | OUTPATIENT
Start: 2018-09-26 | End: 2018-10-19 | Stop reason: SDUPTHER

## 2018-09-26 NOTE — PROGRESS NOTES
HISTORY OF PRESENT ILLNESS Jd Jackson is a 21 y.o. female. HPI Comments: Patient stated hat her panic episodes are increasing in frequency and intensity. Patient complaints of loss of energy, excessive eating and sleeping, loss of interest. She denies any manic episodes. Panic Attack The history is provided by the patient. This is a new problem. The current episode started more than 1 week ago. The problem occurs every several days. The problem has been gradually worsening. Associated symptoms include headaches. Pertinent negatives include no chest pain, no abdominal pain and no shortness of breath. Nothing aggravates the symptoms. Nothing relieves the symptoms. Treatments tried: therapy. The treatment provided no relief. Review of Systems Constitutional: Negative for chills, fever and malaise/fatigue. Respiratory: Negative for shortness of breath. Cardiovascular: Negative for chest pain and palpitations. Gastrointestinal: Negative for abdominal pain, heartburn, nausea and vomiting. Musculoskeletal: Negative for myalgias. Neurological: Positive for headaches. Negative for dizziness, tingling and tremors. Psychiatric/Behavioral: Positive for depression. Negative for hallucinations, memory loss, substance abuse and suicidal ideas. The patient is nervous/anxious. The patient does not have insomnia. Physical Exam  
Constitutional: She is oriented to person, place, and time. She appears well-developed and well-nourished. No distress. HENT:  
Head: Normocephalic and atraumatic. Eyes: Pupils are equal, round, and reactive to light. Neck: Neck supple. Cardiovascular: Regular rhythm. Pulmonary/Chest: Breath sounds normal. No respiratory distress. She has no wheezes. Neurological: She is alert and oriented to person, place, and time. No cranial nerve deficit. Skin: Skin is dry. She is not diaphoretic.   
Psychiatric: Her speech is normal and behavior is normal. Judgment and thought content normal. Her mood appears not anxious. Her affect is not angry and not blunt. She is not agitated and not aggressive. Thought content is not paranoid and not delusional. Cognition and memory are normal. She does not exhibit a depressed mood. She expresses no homicidal and no suicidal ideation. She expresses no suicidal plans and no homicidal plans. Nursing note and vitals reviewed. ASSESSMENT and PLAN 
  ICD-10-CM ICD-9-CM 1. Moderate episode of recurrent major depressive disorder (HCC) F33.1 296.32 sertraline (ZOLOFT) 50 mg tablet  
patient advised to continue therapy. Exercise as tolerated. Report back worsening mood changes or thoughts of suicide immediately. Patient verbalized understanding.

## 2018-09-26 NOTE — PATIENT INSTRUCTIONS
Depression Treatment: Care Instructions Your Care Instructions Depression is a condition that affects the way you feel, think, and act. It causes symptoms such as low energy, loss of interest in daily activities, and sadness or grouchiness that goes on for a long time. Depression is very common and affects men and women of all ages. Depression is a medical illness caused by changes in the natural chemicals in your brain. It is not a character flaw, and it does not mean that you are a bad or weak person. It does not mean that you are going crazy. It is important to know that depression can be treated. Medicines, counseling, and self-care can all help. Many people do not get help because they are embarrassed or think that they will get over the depression on their own. But some people do not get better without treatment. Follow-up care is a key part of your treatment and safety. Be sure to make and go to all appointments, and call your doctor if you are having problems. It's also a good idea to know your test results and keep a list of the medicines you take. How can you care for yourself at home? Learn about antidepressant medicines Antidepressant medicines can improve or end the symptoms of depression. You may need to take the medicine for at least 6 months, and often longer. Keep taking your medicine even if you feel better. If you stop taking it too soon, your symptoms may come back or get worse. You may start to feel better within 1 to 3 weeks of taking antidepressant medicine. But it can take as many as 6 to 8 weeks to see more improvement. Talk to your doctor if you have problems with your medicine or if you do not notice any improvement after 3 weeks. Antidepressants can make you feel tired, dizzy, or nervous. Some people have dry mouth, constipation, headaches, sexual problems, an upset stomach, or diarrhea.  Many of these side effects are mild and go away on their own after you take the medicine for a few weeks. Some may last longer. Talk to your doctor if side effects bother you too much. You might be able to try a different medicine. If you are pregnant or breastfeeding, talk to your doctor about what medicines you can take. Learn about counseling In many cases, counseling can work as well as medicines to treat mild to moderate depression. Counseling is done by licensed mental health providers, such as psychologists, social workers, and some types of nurses. It can be done in one-on-one sessions or in a group setting. Many people find group sessions helpful. Cognitive-behavioral therapy is a type of counseling. In this treatment therapy, you learn how to see and change unhelpful thinking styles that may be adding to your depression. Counseling and medicines often work well when used together. To manage depression · Be physically active. Getting 30 minutes of exercise each day is good for your body and your mind. Begin slowly if it is hard for you to get started. If you already exercise, keep it up. · Plan something pleasant for yourself every day. Include activities that you have enjoyed in the past. 
· Get enough sleep. Talk to your doctor if you have problems sleeping. · Eat a balanced diet. If you do not feel hungry, eat small snacks rather than large meals. · Do not drink alcohol, use illegal drugs, or take medicines that your doctor has not prescribed for you. They may interfere with your treatment. · Spend time with family and friends. It may help to speak openly about your depression with people you trust. 
· Take your medicines exactly as prescribed. Call your doctor if you think you are having a problem with your medicine. · Do not make major life decisions while you are depressed. Depression may change the way you think. You will be able to make better decisions after you feel better. · Think positively. Challenge negative thoughts with statements such as \"I am hopeful\"; \"Things will get better\"; and \"I can ask for the help I need. \" Write down these statements and read them often, even if you don't believe them yet. · Be patient with yourself. It took time for your depression to develop, and it will take time for your symptoms to improve. Do not take on too much or be too hard on yourself. · Learn all you can about depression from written and online materials. · Check out behavioral health classes to learn more about dealing with depression. · Keep the numbers for these national suicide hotlines: 7-776-753-TALK (1-914.181.3972) and 0-730-WKNZRND (0-869.578.1001). If you or someone you know talks about suicide or feeling hopeless, get help right away. When should you call for help? Call 911 anytime you think you may need emergency care. For example, call if: 
  · You feel you cannot stop from hurting yourself or someone else.  
[facility] your doctor now or seek immediate medical care if: 
  · You hear voices.  
  · You feel much more depressed.  
 Watch closely for changes in your health, and be sure to contact your doctor if: 
  · You are having problems with your depression medicine.  
  · You are not getting better as expected. Where can you learn more? Go to http://deven-emigdio.info/. Enter R058 in the search box to learn more about \"Depression Treatment: Care Instructions. \" Current as of: December 7, 2017 Content Version: 11.7 © 7388-1385 [employer]. Care instructions adapted under license by [employer] (which disclaims liability or warranty for this information). If you have questions about a medical condition or this instruction, always ask your healthcare professional. [address] any warranty or liability for your use of this information.

## 2018-09-26 NOTE — MR AVS SNAPSHOT
[address] [place] [address] 
 
 
 [address] [id number] 
[phone] Patient: Letitia Franks MRN: [id number] :[id number] Visit Information Date & Time Provider Department Dept. Phone Encounter #  
 2018  1:00 PM Valeriano Olguin NP [facility] [phone] [id number] Upcoming Health Maintenance Date Due  
 PAP AKA CERVICAL CYTOLOGY 2016 Influenza Age 5 to Adult 10/15/2018* DTaP/Tdap/Td series (4 - Td) 2025 *Topic was postponed. The date shown is not the original due date. Allergies as of 2018  Review Complete On: 2018 By: Arabella Garcia LPN Severity Noted Reaction Type Reactions Other Plant, Animal, Environmental  2016    Hives, Shortness of Breath, Nausea and Vomiting  
 chlorine Current Immunizations  Reviewed on 2018 Name Date DTaP 2015 HPV 2013, 2007 Hep A Vaccine 3/4/2008 Hep B Vaccine 10/27/1999, 1996, 1995 Influenza Vaccine (Quad) PF 2016  2:29 PM  
 MMR 8/15/2000, 9/15/1997 Meningococcal (MCV4P) Vaccine 2013, 2007 Td 8/15/2005 Tdap 2015, 2011 Varicella Virus Vaccine 3/4/2008 Not reviewed this visit You Were Diagnosed With   
  
 Codes Comments Moderate episode of recurrent major depressive disorder ([facility].)    -  Primary ICD-10-CM: F33.1 ICD-9-CM: 296.32 Vitals BP Pulse Temp Resp Height(growth percentile) Weight(growth percentile) 131/84 (BP 1 Location: Right arm, BP Patient Position: Sitting) 95 97.8 °F (36.6 °C) 20 5' 5\" (1.651 m) 292 lb (132.5 kg) LMP SpO2 BMI OB Status Smoking Status 2018 (Approximate) 98% 48.59 kg/m2 Having regular periods Never Smoker BMI and BSA Data Body Mass Index Body Surface Area 48.59 kg/m 2 2.47 m 2 Preferred Pharmacy Pharmacy Name Phone [facility]Y #[id number], [address] [place] [address] Syed Alvarez [phone] Your Updated Medication List  
  
   
This list is accurate as of 9/26/18  1:23 PM.  Always use your most recent med list.  
  
  
  
  
 multivitamin tablet Commonly known as:  ONE A DAY Take 1 Tab by mouth daily. sertraline 50 mg tablet Commonly known as:  ZOLOFT Take 1 Tab by mouth daily for 30 days. Prescriptions Sent to Pharmacy Refills  
 sertraline (ZOLOFT) 50 mg tablet 0 Sig: Take 1 Tab by mouth daily for 30 days. Class: Normal  
 Pharmacy: [address] [place] [address] #: [phone] Route: Oral  
  
Patient Instructions Depression Treatment: Care Instructions Your Care Instructions Depression is a condition that affects the way you feel, think, and act. It causes symptoms such as low energy, loss of interest in daily activities, and sadness or grouchiness that goes on for a long time. Depression is very common and affects men and women of all ages. Depression is a medical illness caused by changes in the natural chemicals in your brain. It is not a character flaw, and it does not mean that you are a bad or weak person. It does not mean that you are going crazy. It is important to know that depression can be treated. Medicines, counseling, and self-care can all help. Many people do not get help because they are embarrassed or think that they will get over the depression on their own. But some people do not get better without treatment. Follow-up care is a key part of your treatment and safety. Be sure to make and go to all appointments, and call your doctor if you are having problems. It's also a good idea to know your test results and keep a list of the medicines you take. How can you care for yourself at home? Learn about antidepressant medicines Antidepressant medicines can improve or end the symptoms of depression. You may need to take the medicine for at least 6 months, and often longer. Keep taking your medicine even if you feel better. If you stop taking it too soon, your symptoms may come back or get worse. You may start to feel better within 1 to 3 weeks of taking antidepressant medicine. But it can take as many as 6 to 8 weeks to see more improvement. Talk to your doctor if you have problems with your medicine or if you do not notice any improvement after 3 weeks. Antidepressants can make you feel tired, dizzy, or nervous. Some people have dry mouth, constipation, headaches, sexual problems, an upset stomach, or diarrhea. Many of these side effects are mild and go away on their own after you take the medicine for a few weeks. Some may last longer. Talk to your doctor if side effects bother you too much. You might be able to try a different medicine. If you are pregnant or breastfeeding, talk to your doctor about what medicines you can take. Learn about counseling In many cases, counseling can work as well as medicines to treat mild to moderate depression. Counseling is done by licensed mental health providers, such as psychologists, social workers, and some types of nurses. It can be done in one-on-one sessions or in a group setting. Many people find group sessions helpful. Cognitive-behavioral therapy is a type of counseling. In this treatment therapy, you learn how to see and change unhelpful thinking styles that may be adding to your depression. Counseling and medicines often work well when used together. To manage depression · Be physically active. Getting 30 minutes of exercise each day is good for your body and your mind. Begin slowly if it is hard for you to get started. If you already exercise, keep it up. · Plan something pleasant for yourself every day. Include activities that you have enjoyed in the past. 
· Get enough sleep. Talk to your doctor if you have problems sleeping. · Eat a balanced diet. If you do not feel hungry, eat small snacks rather than large meals. · Do not drink alcohol, use illegal drugs, or take medicines that your doctor has not prescribed for you. They may interfere with your treatment. · Spend time with family and friends. It may help to speak openly about your depression with people you trust. 
· Take your medicines exactly as prescribed. Call your doctor if you think you are having a problem with your medicine. · Do not make major life decisions while you are depressed. Depression may change the way you think. You will be able to make better decisions after you feel better. · Think positively. Challenge negative thoughts with statements such as \"I am hopeful\"; \"Things will get better\"; and \"I can ask for the help I need. \" Write down these statements and read them often, even if you don't believe them yet. · Be patient with yourself. It took time for your depression to develop, and it will take time for your symptoms to improve. Do not take on too much or be too hard on yourself. · Learn all you can about depression from written and online materials. · Check out behavioral health classes to learn more about dealing with depression. · Keep the numbers for these national suicide hotlines: 8-062-101-TALK (5-834.662.4545) and 7-388-CGDJRBK (4-521.747.3322). If you or someone you know talks about suicide or feeling hopeless, get help right away. When should you call for help? Call 911 anytime you think you may need emergency care. For example, call if: 
  · You feel you cannot stop from hurting yourself or someone else.  
[facility] your doctor now or seek immediate medical care if: 
  · You hear voices.  
  · You feel much more depressed.  
 Watch closely for changes in your health, and be sure to contact your doctor if: 
  · You are having problems with your depression medicine.  
  · You are not getting better as expected. Where can you learn more? Go to http://deven-emigdio.info/. Enter T975 in the search box to learn more about \"Depression Treatment: Care Instructions. \" Current as of: December 7, 2017 Content Version: 11.7 © 2001-5880 [employer]. Care instructions adapted under license by [employer] (which disclaims liability or warranty for this information). If you have questions about a medical condition or this instruction, always ask your healthcare professional. [address] any warranty or liability for your use of this information. Introducing \[facility]\"" & HEALTH SERVICES! Dear Edith Walker: Thank you for requesting a [employer] account. Our records indicate that you already have an active [employer] account. You can access your account anytime at [email or web address] Did you know that you can access your hospital and ER discharge instructions at any time in [employer]? You can also review all of your test results from your hospital stay or ER visit. Additional Information If you have questions, please visit the Frequently Asked Questions section of the [employer] website at [email or web address] Remember, [employer] is NOT to be used for urgent needs. For medical emergencies, dial 911. Now available from your iPhone and Android! Please provide this summary of care documentation to your next provider. Your primary care clinician is listed as Gentry Choi. If you have any questions after today's visit, please call [phone].

## 2018-09-26 NOTE — PROGRESS NOTES
ROOM # 2 Sophie Murrieta presents today for Chief Complaint Patient presents with  Panic Attack x1 year Sophie Murrieta preferred language for health care discussion is english/other. Is someone accompanying this pt? no 
 
Is the patient using any DME equipment during OV? no 
 
Depression Screening: PHQ over the last two weeks 9/26/2018 8/9/2018 8/9/2018 4/21/2018 4/9/2018 1/2/2018 9/8/2017 Little interest or pleasure in doing things Not at all Not at all Not at all Not at all Not at all Not at all Not at all Feeling down, depressed, irritable, or hopeless Not at all Not at all Not at all Not at all Not at all Not at all Not at all Total Score PHQ 2 0 0 0 0 0 0 0 Learning Assessment: 
Learning Assessment 8/29/2017 PRIMARY LEARNER Patient HIGHEST LEVEL OF EDUCATION - PRIMARY LEARNER  GRADUATED HIGH SCHOOL OR GED  
BARRIERS PRIMARY LEARNER NONE  
CO-LEARNER CAREGIVER No  
PRIMARY LANGUAGE ENGLISH  
LEARNER PREFERENCE PRIMARY LISTENING  
ANSWERED BY patient RELATIONSHIP SELF Abuse Screening: No flowsheet data found. Fall Risk No flowsheet data found. Health Maintenance reviewed and discussed per provider. Yes 
 
Sophie Murrieta is due for Health Maintenance Due Topic Date Due  
 PAP AKA CERVICAL CYTOLOGY  08/23/2016  Influenza Age 5 to Adult  08/01/2018 Please order/place referral if appropriate. Advance Directive: 1. Do you have an advance directive in place? Patient Reply: no 
 
2. If not, would you like material regarding how to put one in place? Patient Reply: no 
 
Coordination of Care: 1. Have you been to the ER, urgent care clinic since your last visit? Hospitalized since your last visit? no 
 
2. Have you seen or consulted any other health care providers outside of the [address] [place] [address] since your last visit? Include any pap smears or colon screening.  no

## 2018-10-19 ENCOUNTER — OFFICE VISIT (OUTPATIENT)
Dept: INTERNAL MEDICINE CLINIC | Age: 23
End: 2018-10-19

## 2018-10-19 VITALS
TEMPERATURE: 97.5 F | RESPIRATION RATE: 18 BRPM | HEIGHT: 65 IN | SYSTOLIC BLOOD PRESSURE: 149 MMHG | WEIGHT: 290 LBS | OXYGEN SATURATION: 98 % | HEART RATE: 103 BPM | BODY MASS INDEX: 48.32 KG/M2 | DIASTOLIC BLOOD PRESSURE: 84 MMHG

## 2018-10-19 DIAGNOSIS — Z01.419 WELL WOMAN EXAM WITH ROUTINE GYNECOLOGICAL EXAM: Primary | ICD-10-CM

## 2018-10-19 DIAGNOSIS — F33.1 MODERATE EPISODE OF RECURRENT MAJOR DEPRESSIVE DISORDER (HCC): ICD-10-CM

## 2018-10-19 DIAGNOSIS — E66.01 OBESITY, MORBID (HCC): ICD-10-CM

## 2018-10-19 RX ORDER — SERTRALINE HYDROCHLORIDE 50 MG/1
50 TABLET, FILM COATED ORAL DAILY
Qty: 30 TAB | Refills: 1 | Status: SHIPPED | OUTPATIENT
Start: 2018-10-19 | End: 2018-11-18

## 2018-10-19 NOTE — PROGRESS NOTES
HISTORY OF PRESENT ILLNESS Poncho Zapata is a 21 y.o. female. Well Woman The history is provided by the patient. Pertinent negatives include no chest pain, no abdominal pain, no headaches and no shortness of breath. Medication Evaluation Pertinent negatives include no chest pain, no abdominal pain, no headaches and no shortness of breath. Review of Systems Constitutional: Negative for chills, diaphoresis, fever, malaise/fatigue and weight loss. HENT: Negative for congestion, ear discharge, ear pain, hearing loss, nosebleeds, sore throat and tinnitus. Eyes: Negative for blurred vision, double vision, photophobia, pain, discharge and redness. Respiratory: Negative for cough, hemoptysis, sputum production, shortness of breath, wheezing and stridor. Cardiovascular: Negative for chest pain, palpitations, orthopnea, claudication, leg swelling and PND. Gastrointestinal: Negative for abdominal pain, blood in stool, constipation, diarrhea, heartburn, melena, nausea and vomiting. Genitourinary: Negative for dysuria, flank pain, frequency, hematuria and urgency. Musculoskeletal: Negative for back pain, falls, joint pain, myalgias and neck pain. Skin: Negative for itching and rash. Neurological: Negative for dizziness, tingling, tremors, sensory change, speech change, focal weakness, seizures, loss of consciousness, weakness and headaches. Endo/Heme/Allergies: Negative for environmental allergies. Does not bruise/bleed easily. Psychiatric/Behavioral: Positive for depression. Negative for hallucinations, memory loss, substance abuse and suicidal ideas. The patient is not nervous/anxious and does not have insomnia. Physical Exam  
Constitutional: She is oriented to person, place, and time. She appears well-developed and well-nourished. No distress. HENT:  
Head: Normocephalic and atraumatic.   
Right Ear: External ear normal.  
Left Ear: External ear normal.  
Nose: Nose normal.  
 Mouth/Throat: Oropharynx is clear and moist. No oropharyngeal exudate. Eyes: EOM are normal. Pupils are equal, round, and reactive to light. Right eye exhibits no discharge. Left eye exhibits no discharge. Neck: Normal range of motion. Neck supple. No thyromegaly present. Cardiovascular: Regular rhythm and normal heart sounds. Pulmonary/Chest: Effort normal and breath sounds normal. No respiratory distress. She exhibits no tenderness. Abdominal: Soft. Bowel sounds are normal. She exhibits no distension and no mass. There is no tenderness. Genitourinary: Vagina normal. There is no rash or tenderness on the right labia. There is no rash or tenderness on the left labia. Cervix exhibits no motion tenderness, no discharge and no friability. No erythema in the vagina. No vaginal discharge found. Musculoskeletal: Normal range of motion. She exhibits no edema or tenderness. Lymphadenopathy:  
  She has no cervical adenopathy. Neurological: She is alert and oriented to person, place, and time. She has normal reflexes. No cranial nerve deficit. Skin: Skin is warm and dry. No rash noted. She is not diaphoretic. No erythema. Psychiatric: She has a normal mood and affect. Her behavior is normal. Judgment and thought content normal.  
Nursing note and vitals reviewed. ASSESSMENT and PLAN 
  ICD-10-CM ICD-9-CM 1. Well woman exam with routine gynecological exam Z01.419 V72.31 PAP IG, CT-NG-TV, APTIMA HPV AND RFX 23/15,39([id number]) CBC WITH AUTOMATED DIFF  
   METABOLIC PANEL, COMPREHENSIVE  
   TSH 3RD GENERATION  
   LIPID PANEL  
   HEMOGLOBIN A1C WITH EAG  
   URINALYSIS W/ RFLX MICROSCOPIC  
   PAP IG, CT-NG-TV, APTIMA HPV AND RFX 98/68,50([id number]) 2. Obesity, morbid ([facility] [address] E66.01 278.01   
3. Moderate episode of recurrent major depressive disorder (HCC) F33.1 296.32 sertraline (ZOLOFT) 50 mg tablet Head to toe assessment performed, all basic labs will be obtained.  Patient teaching done regarding healthy eating and exercise. Patient advised to keep in contact with PCP for regular checkups. Further plan of care will be established according to lab results. Patient advised to keep check on her blood pressure and report consistently high readings.

## 2018-10-19 NOTE — PROGRESS NOTES
ROOM # 3 Herbert Frazier presents today for Chief Complaint Patient presents with  Well Woman  Medication Evaluation Herbert Frazier preferred language for health care discussion is english/other. Is someone accompanying this pt? no 
 
Is the patient using any DME equipment during OV? no 
 
Depression Screening: PHQ over the last two weeks 9/26/2018 9/26/2018 8/9/2018 8/9/2018 4/21/2018 4/9/2018 1/2/2018 Little interest or pleasure in doing things More than half the days Not at all Not at all Not at all Not at all Not at all Not at all Feeling down, depressed, irritable, or hopeless Several days Not at all Not at all Not at all Not at all Not at all Not at all Total Score PHQ 2 3 0 0 0 0 0 0 Trouble falling or staying asleep, or sleeping too much More than half the days - - - - - - Feeling tired or having little energy More than half the days - - - - - - Poor appetite, weight loss, or overeating More than half the days - - - - - - Feeling bad about yourself - or that you are a failure or have let yourself or your family down Several days - - - - - - Trouble concentrating on things such as school, work, reading, or watching TV Not at all - - - - - - Moving or speaking so slowly that other people could have noticed; or the opposite being so fidgety that others notice Not at all - - - - - - Thoughts of being better off dead, or hurting yourself in some way Not at all - - - - - -  
PHQ 9 Score 10 - - - - - - How difficult have these problems made it for you to do your work, take care of your home and get along with others Very difficult - - - - - - Learning Assessment: 
Learning Assessment 8/29/2017 PRIMARY LEARNER Patient HIGHEST LEVEL OF EDUCATION - PRIMARY LEARNER  GRADUATED HIGH SCHOOL OR GED  
BARRIERS PRIMARY LEARNER NONE  
CO-LEARNER CAREGIVER No  
PRIMARY LANGUAGE ENGLISH  
LEARNER PREFERENCE PRIMARY LISTENING  
ANSWERED BY patient RELATIONSHIP SELF  
 
 
 Abuse Screening: No flowsheet data found. Fall Risk No flowsheet data found. Health Maintenance reviewed and discussed per provider. Yes 
 
Darylene Chamorro is due for Health Maintenance Due Topic Date Due  
 PAP AKA CERVICAL CYTOLOGY  08/23/2016  Influenza Age 5 to Adult  08/01/2018 Please order/place referral if appropriate. Advance Directive: 1. Do you have an advance directive in place? Patient Reply: no 
 
2. If not, would you like material regarding how to put one in place? Patient Reply: no 
 
Coordination of Care: 1. Have you been to the ER, urgent care clinic since your last visit? Hospitalized since your last visit? no 
 
2. Have you seen or consulted any other health care providers outside of the [address] [place] [address] since your last visit? Include any pap smears or colon screening.  no

## 2018-10-20 LAB
ALBUMIN SERPL-MCNC: 4.2 G/DL (ref 3.5–5.5)
ALBUMIN/GLOB SERPL: 1.7 {RATIO} (ref 1.2–2.2)
ALP SERPL-CCNC: 84 IU/L (ref 39–117)
ALT SERPL-CCNC: 17 IU/L (ref 0–32)
APPEARANCE UR: CLEAR
AST SERPL-CCNC: 16 IU/L (ref 0–40)
BASOPHILS # BLD AUTO: 0 X10E3/UL (ref 0–0.2)
BASOPHILS NFR BLD AUTO: 0 %
BILIRUB SERPL-MCNC: <0.2 MG/DL (ref 0–1.2)
BILIRUB UR QL STRIP: NEGATIVE
BUN SERPL-MCNC: 9 MG/DL (ref 6–20)
BUN/CREAT SERPL: 18 (ref 9–23)
CALCIUM SERPL-MCNC: 9.2 MG/DL (ref 8.7–10.2)
CHLORIDE SERPL-SCNC: 105 MMOL/L (ref 96–106)
CHOLEST SERPL-MCNC: 147 MG/DL (ref 100–199)
CO2 SERPL-SCNC: 21 MMOL/L (ref 20–29)
COLOR UR: YELLOW
CREAT SERPL-MCNC: 0.5 MG/DL (ref 0.57–1)
EOSINOPHIL # BLD AUTO: 0.2 X10E3/UL (ref 0–0.4)
EOSINOPHIL NFR BLD AUTO: 2 %
ERYTHROCYTE [DISTWIDTH] IN BLOOD BY AUTOMATED COUNT: 12.9 % (ref 12.3–15.4)
EST. AVERAGE GLUCOSE BLD GHB EST-MCNC: 94 MG/DL
GLOBULIN SER CALC-MCNC: 2.5 G/DL (ref 1.5–4.5)
GLUCOSE SERPL-MCNC: 91 MG/DL (ref 65–99)
GLUCOSE UR QL: NEGATIVE
HBA1C MFR BLD: 4.9 % (ref 4.8–5.6)
HCT VFR BLD AUTO: 38.5 % (ref 34–46.6)
HDLC SERPL-MCNC: 43 MG/DL
HGB BLD-MCNC: 12.2 G/DL (ref 11.1–15.9)
HGB UR QL STRIP: NEGATIVE
IMM GRANULOCYTES # BLD: 0 X10E3/UL (ref 0–0.1)
IMM GRANULOCYTES NFR BLD: 0 %
INTERPRETATION, 910389: NORMAL
KETONES UR QL STRIP: NEGATIVE
LDLC SERPL CALC-MCNC: 87 MG/DL (ref 0–99)
LEUKOCYTE ESTERASE UR QL STRIP: NEGATIVE
LYMPHOCYTES # BLD AUTO: 3.2 X10E3/UL (ref 0.7–3.1)
LYMPHOCYTES NFR BLD AUTO: 32 %
MCH RBC QN AUTO: 26.2 PG (ref 26.6–33)
MCHC RBC AUTO-ENTMCNC: 31.7 G/DL (ref 31.5–35.7)
MCV RBC AUTO: 83 FL (ref 79–97)
MICRO URNS: NORMAL
MONOCYTES # BLD AUTO: 0.6 X10E3/UL (ref 0.1–0.9)
MONOCYTES NFR BLD AUTO: 6 %
NEUTROPHILS # BLD AUTO: 6.1 X10E3/UL (ref 1.4–7)
NEUTROPHILS NFR BLD AUTO: 60 %
NITRITE UR QL STRIP: NEGATIVE
PH UR STRIP: 5.5 [PH] (ref 5–7.5)
PLATELET # BLD AUTO: 397 X10E3/UL (ref 150–379)
POTASSIUM SERPL-SCNC: 4.1 MMOL/L (ref 3.5–5.2)
PROT SERPL-MCNC: 6.7 G/DL (ref 6–8.5)
PROT UR QL STRIP: NEGATIVE
RBC # BLD AUTO: 4.66 X10E6/UL (ref 3.77–5.28)
SODIUM SERPL-SCNC: 140 MMOL/L (ref 134–144)
SP GR UR: 1.02 (ref 1–1.03)
TRIGL SERPL-MCNC: 87 MG/DL (ref 0–149)
TSH SERPL DL<=0.005 MIU/L-ACNC: 0.92 UIU/ML (ref 0.45–4.5)
UROBILINOGEN UR STRIP-MCNC: 0.2 MG/DL (ref 0.2–1)
VLDLC SERPL CALC-MCNC: 17 MG/DL (ref 5–40)
WBC # BLD AUTO: 10.1 X10E3/UL (ref 3.4–10.8)

## 2018-10-23 LAB
C TRACH RRNA CVX QL NAA+PROBE: NEGATIVE
CYTOLOGIST CVX/VAG CYTO: NORMAL
CYTOLOGY CVX/VAG DOC THIN PREP: NORMAL
DX ICD CODE: NORMAL
HPV I/H RISK 4 DNA CVX QL PROBE+SIG AMP: NEGATIVE
Lab: NORMAL
Lab: NORMAL
N GONORRHOEA RRNA CVX QL NAA+PROBE: NEGATIVE
OTHER STN SPEC: NORMAL
PATH REPORT.FINAL DX SPEC: NORMAL
STAT OF ADQ CVX/VAG CYTO-IMP: NORMAL
T VAGINALIS RRNA SPEC QL NAA+PROBE: NEGATIVE

## 2019-04-10 ENCOUNTER — HOSPITAL ENCOUNTER (EMERGENCY)
Age: 24
Discharge: HOME | End: 2019-04-10
Attending: EMERGENCY MEDICINE
Payer: COMMERCIAL

## 2019-04-10 ENCOUNTER — APPOINTMENT (OUTPATIENT)
Dept: GENERAL RADIOLOGY | Age: 24
End: 2019-04-10
Attending: PHYSICIAN ASSISTANT
Payer: COMMERCIAL

## 2019-04-10 VITALS
BODY MASS INDEX: 46.65 KG/M2 | DIASTOLIC BLOOD PRESSURE: 100 MMHG | RESPIRATION RATE: 20 BRPM | HEIGHT: 65 IN | SYSTOLIC BLOOD PRESSURE: 146 MMHG | OXYGEN SATURATION: 98 % | WEIGHT: 280 LBS | HEART RATE: 103 BPM | TEMPERATURE: 98 F

## 2019-04-10 DIAGNOSIS — S39.012A LOW BACK STRAIN, INITIAL ENCOUNTER: ICD-10-CM
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
## 2019-04-10 DIAGNOSIS — S16.1XXA STRAIN OF NECK MUSCLE, INITIAL ENCOUNTER: Primary | ICD-10-CM

## 2019-04-10 DIAGNOSIS — S80.12XA CONTUSION OF LEFT LEG, INITIAL ENCOUNTER: ICD-10-CM

## 2019-04-10 PROCEDURE — 99283 EMERGENCY DEPT VISIT LOW MDM: CPT

## 2019-04-10 PROCEDURE — 74011250637 HC RX REV CODE- 250/637: Performed by: PHYSICIAN ASSISTANT

## 2019-04-10 PROCEDURE — 73590 X-RAY EXAM OF LOWER LEG: CPT

## 2019-04-10 PROCEDURE — 72050 X-RAY EXAM NECK SPINE 4/5VWS: CPT

## 2019-04-10 RX ORDER — IBUPROFEN 600 MG/1
600 TABLET ORAL
Qty: 20 TAB | Refills: 0 | Status: SHIPPED | OUTPATIENT
Start: 2019-04-10

## 2019-04-10 RX ORDER — CHLORZOXAZONE 500 MG/1
500 TABLET ORAL
Qty: 15 TAB | Refills: 0 | Status: SHIPPED | OUTPATIENT
Start: 2019-04-10

## 2019-04-10 RX ORDER — IBUPROFEN 600 MG/1
600 TABLET ORAL
Status: COMPLETED | OUTPATIENT
Start: 2019-04-10 | End: 2019-04-10

## 2019-04-10 RX ADMIN — IBUPROFEN 600 MG: 600 TABLET, FILM COATED ORAL at 15:55
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
